# Patient Record
Sex: FEMALE | Race: WHITE | Employment: PART TIME | URBAN - METROPOLITAN AREA
[De-identification: names, ages, dates, MRNs, and addresses within clinical notes are randomized per-mention and may not be internally consistent; named-entity substitution may affect disease eponyms.]

---

## 2017-02-17 ENCOUNTER — OFFICE VISIT (OUTPATIENT)
Dept: INTERNAL MEDICINE CLINIC | Age: 52
End: 2017-02-17

## 2017-02-17 VITALS
WEIGHT: 157 LBS | DIASTOLIC BLOOD PRESSURE: 78 MMHG | SYSTOLIC BLOOD PRESSURE: 118 MMHG | HEIGHT: 67 IN | BODY MASS INDEX: 24.64 KG/M2 | HEART RATE: 68 BPM

## 2017-02-17 DIAGNOSIS — R29.2 HYPERREFLEXIA: Primary | ICD-10-CM

## 2017-02-17 DIAGNOSIS — G89.29 CHRONIC RIGHT-SIDED LOW BACK PAIN, WITH SCIATICA PRESENCE UNSPECIFIED: ICD-10-CM

## 2017-02-17 DIAGNOSIS — F10.10 ALCOHOL ABUSE: ICD-10-CM

## 2017-02-17 DIAGNOSIS — Z13.220 LIPID SCREENING: ICD-10-CM

## 2017-02-17 DIAGNOSIS — M54.5 CHRONIC RIGHT-SIDED LOW BACK PAIN, WITH SCIATICA PRESENCE UNSPECIFIED: ICD-10-CM

## 2017-02-17 DIAGNOSIS — Z11.59 NEED FOR HEPATITIS C SCREENING TEST: ICD-10-CM

## 2017-02-17 DIAGNOSIS — Z13.1 SCREENING FOR DIABETES MELLITUS: ICD-10-CM

## 2017-02-17 PROCEDURE — 99214 OFFICE O/P EST MOD 30 MIN: CPT | Performed by: INTERNAL MEDICINE

## 2017-02-17 RX ORDER — CETIRIZINE HYDROCHLORIDE 10 MG/1
10 TABLET ORAL DAILY
COMMUNITY

## 2017-02-18 ENCOUNTER — HOSPITAL ENCOUNTER (OUTPATIENT)
Dept: OTHER | Age: 52
Discharge: HOME | End: 2017-02-18
Attending: INTERNAL MEDICINE | Admitting: INTERNAL MEDICINE

## 2017-02-18 DIAGNOSIS — Z13.1 SCREENING FOR DIABETES MELLITUS: ICD-10-CM

## 2017-02-18 DIAGNOSIS — Z11.59 NEED FOR HEPATITIS C SCREENING TEST: ICD-10-CM

## 2017-02-18 DIAGNOSIS — Z13.220 LIPID SCREENING: ICD-10-CM

## 2017-02-18 DIAGNOSIS — R29.2 HYPERREFLEXIA: ICD-10-CM

## 2017-02-18 LAB
A/G RATIO: 1.3 (ref 1.1–2.2)
ALBUMIN SERPL-MCNC: 4.4 G/DL (ref 3.4–5)
ALP BLD-CCNC: 62 U/L (ref 40–129)
ALT SERPL-CCNC: 17 U/L (ref 10–40)
ANION GAP SERPL CALCULATED.3IONS-SCNC: 13 MMOL/L (ref 3–16)
AST SERPL-CCNC: 19 U/L (ref 15–37)
BILIRUB SERPL-MCNC: 0.7 MG/DL (ref 0–1)
BUN BLDV-MCNC: 12 MG/DL (ref 7–20)
CALCIUM SERPL-MCNC: 9.2 MG/DL (ref 8.3–10.6)
CHLORIDE BLD-SCNC: 101 MMOL/L (ref 99–110)
CHOLESTEROL, TOTAL: 206 MG/DL (ref 0–199)
CO2: 28 MMOL/L (ref 21–32)
CREAT SERPL-MCNC: <0.5 MG/DL (ref 0.6–1.1)
GFR AFRICAN AMERICAN: >60
GFR NON-AFRICAN AMERICAN: >60
GLOBULIN: 3.3 G/DL
GLUCOSE BLD-MCNC: 76 MG/DL (ref 70–99)
HCT VFR BLD CALC: 42.1 % (ref 36–48)
HDLC SERPL-MCNC: 86 MG/DL (ref 40–60)
HEMOGLOBIN: 14.1 G/DL (ref 12–16)
LDL CHOLESTEROL CALCULATED: 111 MG/DL
MCH RBC QN AUTO: 33.3 PG (ref 26–34)
MCHC RBC AUTO-ENTMCNC: 33.6 G/DL (ref 31–36)
MCV RBC AUTO: 99.2 FL (ref 80–100)
PDW BLD-RTO: 12.9 % (ref 12.4–15.4)
PLATELET # BLD: 249 K/UL (ref 135–450)
PMV BLD AUTO: 8.8 FL (ref 5–10.5)
POTASSIUM SERPL-SCNC: 4.7 MMOL/L (ref 3.5–5.1)
RBC # BLD: 4.25 M/UL (ref 4–5.2)
SODIUM BLD-SCNC: 142 MMOL/L (ref 136–145)
TOTAL PROTEIN: 7.7 G/DL (ref 6.4–8.2)
TRIGL SERPL-MCNC: 44 MG/DL (ref 0–150)
TSH REFLEX: 1.15 UIU/ML (ref 0.27–4.2)
VLDLC SERPL CALC-MCNC: 9 MG/DL
WBC # BLD: 4.7 K/UL (ref 4–11)

## 2017-02-19 LAB
FOLATE: >20 NG/ML (ref 4.78–24.2)
HEPATITIS C ANTIBODY INTERPRETATION: NORMAL
VITAMIN B-12: 587 PG/ML (ref 211–911)

## 2017-03-03 ENCOUNTER — OFFICE VISIT (OUTPATIENT)
Dept: INTERNAL MEDICINE CLINIC | Age: 52
End: 2017-03-03

## 2017-03-03 VITALS
BODY MASS INDEX: 24.33 KG/M2 | WEIGHT: 155 LBS | HEART RATE: 65 BPM | SYSTOLIC BLOOD PRESSURE: 124 MMHG | HEIGHT: 67 IN | DIASTOLIC BLOOD PRESSURE: 74 MMHG | OXYGEN SATURATION: 100 %

## 2017-03-03 DIAGNOSIS — G89.29 CHRONIC BILATERAL LOW BACK PAIN WITHOUT SCIATICA: ICD-10-CM

## 2017-03-03 DIAGNOSIS — M54.50 CHRONIC BILATERAL LOW BACK PAIN WITHOUT SCIATICA: ICD-10-CM

## 2017-03-03 DIAGNOSIS — F10.10 ALCOHOL ABUSE: ICD-10-CM

## 2017-03-03 DIAGNOSIS — R25.8 CLONUS: ICD-10-CM

## 2017-03-03 DIAGNOSIS — Z00.00 ANNUAL PHYSICAL EXAM: Primary | ICD-10-CM

## 2017-03-03 PROCEDURE — 90715 TDAP VACCINE 7 YRS/> IM: CPT | Performed by: INTERNAL MEDICINE

## 2017-03-03 PROCEDURE — 99396 PREV VISIT EST AGE 40-64: CPT | Performed by: INTERNAL MEDICINE

## 2017-03-03 PROCEDURE — 90471 IMMUNIZATION ADMIN: CPT | Performed by: INTERNAL MEDICINE

## 2017-03-03 RX ORDER — NALTREXONE HYDROCHLORIDE 50 MG/1
50 TABLET, FILM COATED ORAL DAILY
Qty: 30 TABLET | Refills: 1 | Status: SHIPPED | OUTPATIENT
Start: 2017-03-03 | End: 2018-02-07

## 2017-03-29 ENCOUNTER — TELEPHONE (OUTPATIENT)
Dept: INTERNAL MEDICINE CLINIC | Age: 52
End: 2017-03-29

## 2017-09-01 ENCOUNTER — OFFICE VISIT (OUTPATIENT)
Dept: INTERNAL MEDICINE CLINIC | Age: 52
End: 2017-09-01

## 2017-09-01 VITALS
DIASTOLIC BLOOD PRESSURE: 86 MMHG | HEART RATE: 82 BPM | OXYGEN SATURATION: 96 % | HEIGHT: 67 IN | BODY MASS INDEX: 24.17 KG/M2 | WEIGHT: 154 LBS | SYSTOLIC BLOOD PRESSURE: 133 MMHG

## 2017-09-01 DIAGNOSIS — M25.532 WRIST PAIN, ACUTE, LEFT: Primary | ICD-10-CM

## 2017-09-01 DIAGNOSIS — M25.561 ACUTE PAIN OF RIGHT KNEE: ICD-10-CM

## 2017-09-01 DIAGNOSIS — L98.9 SKIN LESIONS: ICD-10-CM

## 2017-09-01 PROCEDURE — 99214 OFFICE O/P EST MOD 30 MIN: CPT | Performed by: INTERNAL MEDICINE

## 2018-01-09 ENCOUNTER — OFFICE VISIT (OUTPATIENT)
Dept: DERMATOLOGY | Age: 53
End: 2018-01-09

## 2018-01-09 DIAGNOSIS — D22.9 MULTIPLE BENIGN NEVI: Primary | ICD-10-CM

## 2018-01-09 DIAGNOSIS — I87.8 VENOUS STASIS: ICD-10-CM

## 2018-01-09 DIAGNOSIS — L82.1 SEBORRHEIC KERATOSES: ICD-10-CM

## 2018-01-09 DIAGNOSIS — L98.8 FACIAL RHYTIDS: ICD-10-CM

## 2018-01-09 DIAGNOSIS — Z12.83 SCREENING EXAM FOR SKIN CANCER: ICD-10-CM

## 2018-01-09 PROCEDURE — 99243 OFF/OP CNSLTJ NEW/EST LOW 30: CPT | Performed by: DERMATOLOGY

## 2018-01-09 RX ORDER — M-VIT,TX,IRON,MINS/CALC/FOLIC 27MG-0.4MG
1 TABLET ORAL DAILY
COMMUNITY

## 2018-01-09 NOTE — PATIENT INSTRUCTIONS
Protecting Yourself From the Sun    · Apply broad spectrum water resistant sunscreen with an SPF of at least 30 to exposed areas of the skin. Dont forget the ears and lips! Remember to reapply sunscreen about every 2 hours and after swimming or sweating. · Wear sun protective clothing. Swim shirts (aka. rash guards) are a great idea and negates the need to reapply sunscreen in those areas. · Seek the shade whenever possible especially between the hours of 10 am and 4 pm when the suns rays are the strongest.     · Avoid tanning beds       Tretinoin / Differin (Adapalene) / Tazorac (Tazarotene)     Your physician has prescribed a topical medication that affects the growth and turnover of skin cells. We are providing you with the following information so that you understand how the medication should be used and potential side effects you may experience. [facility] Wash your face with mild soap and water, then allow to dry before applying the medication.  Apply a pea-sized amount to your entire face. Applying more than this may result in more irritation. Use the minimum amount possible to areas such as the corners of the mouth, the angles of the nose and the eyelids.  Common side effects include: burning/stinging, redness, dryness, peeling and itching. These side effects typically decrease with continued use of the medication.  You may use moisturizing creams or lotions to help reduce these side effects.  If you continue to experience these side effects, you may decrease your use of the medication to once every 2 or 3 days.  Your acne may appear worse during the first few weeks of using the medication.  It may take 6 weeks or more to notice improvement from the medication.  STOP using the medication if you become pregnant.

## 2018-01-09 NOTE — Clinical Note
Dear [facility],  I had the pleasure of seeing your patient, Fermin Gonzalez, in my office today. Thanks so much for involving me in her care. Please see my note and call me if you have any questions.   Hussein Herrera

## 2018-02-07 ENCOUNTER — OFFICE VISIT (OUTPATIENT)
Dept: INTERNAL MEDICINE CLINIC | Age: 53
End: 2018-02-07

## 2018-02-07 VITALS
BODY MASS INDEX: 24.8 KG/M2 | HEART RATE: 66 BPM | DIASTOLIC BLOOD PRESSURE: 76 MMHG | SYSTOLIC BLOOD PRESSURE: 124 MMHG | WEIGHT: 156 LBS

## 2018-02-07 DIAGNOSIS — G89.29 CHRONIC PAIN OF RIGHT KNEE: Primary | ICD-10-CM

## 2018-02-07 DIAGNOSIS — M25.561 CHRONIC PAIN OF RIGHT KNEE: Primary | ICD-10-CM

## 2018-02-07 PROCEDURE — G8484 FLU IMMUNIZE NO ADMIN: HCPCS | Performed by: INTERNAL MEDICINE

## 2018-02-07 PROCEDURE — 1036F TOBACCO NON-USER: CPT | Performed by: INTERNAL MEDICINE

## 2018-02-07 PROCEDURE — G8427 DOCREV CUR MEDS BY ELIG CLIN: HCPCS | Performed by: INTERNAL MEDICINE

## 2018-02-07 PROCEDURE — 3017F COLORECTAL CA SCREEN DOC REV: CPT | Performed by: INTERNAL MEDICINE

## 2018-02-07 PROCEDURE — G8420 CALC BMI NORM PARAMETERS: HCPCS | Performed by: INTERNAL MEDICINE

## 2018-02-07 PROCEDURE — 99213 OFFICE O/P EST LOW 20 MIN: CPT | Performed by: INTERNAL MEDICINE

## 2018-02-07 PROCEDURE — 3014F SCREEN MAMMO DOC REV: CPT | Performed by: INTERNAL MEDICINE

## 2018-04-18 ENCOUNTER — TELEPHONE (OUTPATIENT)
Dept: INTERNAL MEDICINE CLINIC | Age: 53
End: 2018-04-18

## 2018-04-23 PROBLEM — Z78.9 ALCOHOL CONSUMPTION OF MORE THAN FOUR DRINKS PER DAY: Status: ACTIVE | Noted: 2018-04-23

## 2018-04-24 ENCOUNTER — OFFICE VISIT (OUTPATIENT)
Dept: INTERNAL MEDICINE CLINIC | Age: 53
End: 2018-04-24

## 2018-04-24 VITALS
SYSTOLIC BLOOD PRESSURE: 134 MMHG | DIASTOLIC BLOOD PRESSURE: 74 MMHG | WEIGHT: 161 LBS | HEIGHT: 67 IN | HEART RATE: 76 BPM | BODY MASS INDEX: 25.27 KG/M2

## 2018-04-24 DIAGNOSIS — Z78.9 ALCOHOL CONSUMPTION OF MORE THAN FOUR DRINKS PER DAY: ICD-10-CM

## 2018-04-24 DIAGNOSIS — Z13.220 LIPID SCREENING: ICD-10-CM

## 2018-04-24 DIAGNOSIS — Z13.1 SCREENING FOR DIABETES MELLITUS: ICD-10-CM

## 2018-04-24 DIAGNOSIS — Z00.00 ANNUAL PHYSICAL EXAM: Primary | ICD-10-CM

## 2018-04-24 PROCEDURE — 99396 PREV VISIT EST AGE 40-64: CPT | Performed by: INTERNAL MEDICINE

## 2018-04-24 ASSESSMENT — PATIENT HEALTH QUESTIONNAIRE - PHQ9
SUM OF ALL RESPONSES TO PHQ9 QUESTIONS 1 & 2: 1
1. LITTLE INTEREST OR PLEASURE IN DOING THINGS: 0
2. FEELING DOWN, DEPRESSED OR HOPELESS: 1
SUM OF ALL RESPONSES TO PHQ QUESTIONS 1-9: 1

## 2018-08-14 ENCOUNTER — OFFICE VISIT (OUTPATIENT)
Dept: INTERNAL MEDICINE CLINIC | Age: 53
End: 2018-08-14

## 2018-08-14 VITALS
BODY MASS INDEX: 24.9 KG/M2 | DIASTOLIC BLOOD PRESSURE: 82 MMHG | HEART RATE: 76 BPM | OXYGEN SATURATION: 98 % | SYSTOLIC BLOOD PRESSURE: 134 MMHG | WEIGHT: 159 LBS

## 2018-08-14 DIAGNOSIS — M25.522 LEFT ELBOW PAIN: ICD-10-CM

## 2018-08-14 DIAGNOSIS — M25.562 ACUTE PAIN OF BOTH KNEES: Primary | ICD-10-CM

## 2018-08-14 DIAGNOSIS — M72.2 PLANTAR FASCIITIS OF RIGHT FOOT: ICD-10-CM

## 2018-08-14 DIAGNOSIS — M25.561 ACUTE PAIN OF BOTH KNEES: Primary | ICD-10-CM

## 2018-08-14 PROCEDURE — 3017F COLORECTAL CA SCREEN DOC REV: CPT | Performed by: INTERNAL MEDICINE

## 2018-08-14 PROCEDURE — 1036F TOBACCO NON-USER: CPT | Performed by: INTERNAL MEDICINE

## 2018-08-14 PROCEDURE — G8427 DOCREV CUR MEDS BY ELIG CLIN: HCPCS | Performed by: INTERNAL MEDICINE

## 2018-08-14 PROCEDURE — 99214 OFFICE O/P EST MOD 30 MIN: CPT | Performed by: INTERNAL MEDICINE

## 2018-08-14 PROCEDURE — G8420 CALC BMI NORM PARAMETERS: HCPCS | Performed by: INTERNAL MEDICINE

## 2018-08-14 NOTE — PROGRESS NOTES
tenderness found. Right foot: There is normal range of motion, no tenderness and no swelling. Skin: No pallor.

## 2019-04-09 ENCOUNTER — OFFICE VISIT (OUTPATIENT)
Dept: INTERNAL MEDICINE CLINIC | Age: 54
End: 2019-04-09
Payer: COMMERCIAL

## 2019-04-09 VITALS
BODY MASS INDEX: 24.75 KG/M2 | SYSTOLIC BLOOD PRESSURE: 120 MMHG | WEIGHT: 158 LBS | OXYGEN SATURATION: 98 % | HEART RATE: 62 BPM | DIASTOLIC BLOOD PRESSURE: 64 MMHG | TEMPERATURE: 97.9 F

## 2019-04-09 DIAGNOSIS — R14.0 ABDOMINAL DISTENTION: Primary | ICD-10-CM

## 2019-04-09 DIAGNOSIS — R10.814 LEFT LOWER QUADRANT ABDOMINAL TENDERNESS WITHOUT REBOUND TENDERNESS: ICD-10-CM

## 2019-04-09 DIAGNOSIS — Z83.49 FH: THYROID DISEASE: ICD-10-CM

## 2019-04-09 DIAGNOSIS — Z13.220 LIPID SCREENING: ICD-10-CM

## 2019-04-09 LAB
BILIRUBIN, POC: NORMAL
BLOOD URINE, POC: NORMAL
CLARITY, POC: CLEAR
COLOR, POC: YELLOW
CONTROL: NORMAL
GLUCOSE URINE, POC: NORMAL
KETONES, POC: 5
LEUKOCYTE EST, POC: NORMAL
NITRITE, POC: NORMAL
PH, POC: 7
PREGNANCY TEST URINE, POC: NEGATIVE
PROTEIN, POC: NORMAL
SPECIFIC GRAVITY, POC: 1
UROBILINOGEN, POC: 0.2

## 2019-04-09 PROCEDURE — 81002 URINALYSIS NONAUTO W/O SCOPE: CPT | Performed by: INTERNAL MEDICINE

## 2019-04-09 PROCEDURE — 81025 URINE PREGNANCY TEST: CPT | Performed by: INTERNAL MEDICINE

## 2019-04-09 PROCEDURE — 99214 OFFICE O/P EST MOD 30 MIN: CPT | Performed by: INTERNAL MEDICINE

## 2019-04-09 SDOH — HEALTH STABILITY: MENTAL HEALTH: HOW OFTEN DO YOU HAVE A DRINK CONTAINING ALCOHOL?: 4 OR MORE TIMES A WEEK

## 2019-04-09 SDOH — HEALTH STABILITY: MENTAL HEALTH: HOW MANY STANDARD DRINKS CONTAINING ALCOHOL DO YOU HAVE ON A TYPICAL DAY?: 3 OR 4

## 2019-04-09 ASSESSMENT — ENCOUNTER SYMPTOMS
COUGH: 0
SHORTNESS OF BREATH: 0

## 2019-04-09 NOTE — PROGRESS NOTES
[facility]  Internal Medicine Progress Note  Coleen Graham MD    DOS: 2019    Venessa Farris  :1965    ASSESSMENT/PLAN:   Abdominal distention  Left lower quadrant abdominal tenderness without rebound tenderness  Differential diagnosis includes but is not limited to diverticulitis, ovarian pathology such as cyst or mass. Check urine CBC and CMP today. Schedule abdominal pelvic CT. Patient instructed to contact office if unable to get scheduled for this week. Also ordered labs for her physical which is less than a month. Discussed medications with patient who voiced understanding of their use and indications. All questions answered. Future Appointments   Date Time Provider Matthieu Pickett   2019  9:00 AM Coleen Graham MD [facility]   2019 10:30 AM Laura Resendiz MD [facility]          Medical Assistant Triage:  Chief Complaint   Patient presents with   [facility]     swelling in abdomen, not painful but discomfort x 1 week. HPI:   This is a 52 y. o.female. She is here today for abdominal distention. .     Perimenopausal but only 2 periods in last week. 2 weeks ago had typcial symptoms of ovulation but also tender nipples. Has not had period since then, but since then still bloated in abdomen. Was constipated a few weeks ago but seems back to normal. Has small amount of blood with the constipation. Since then no melena or hematochezia. Normal caliber stool. Denies any abdominal pain. Decreased appetite. Feels full all of the time. No nausea or vomiting. Denies any dysuria urgency or frequency. No vaginal discharge at this time although had small amount 2 weeks ago when she had the left lower quadrant pain typical of her ovulation. No fevers chills or night sweats. Patient is particularly concerned about the abdominal distention because her brother is doing with end-stage pancreatic cancer and she has a sister who has had anal cancer.   Her last colonoscopy was in  was normal.  Next was due in . Review of Systems   Constitutional: Negative for unexpected weight change. Respiratory: Negative for cough and shortness of breath. Cardiovascular: Negative for chest pain and leg swelling. As per HPI. Patient Active Problem List   Diagnosis    Fatigue    Chronic depression    Lateral subluxation of patella    Knee pain    Patella, chondromalacia    Patella-femoral syndrome    Alcohol consumption of more than four drinks per day     Prior to Visit Medications    Medication Sig Taking? Authorizing Provider   Multiple Vitamins-Minerals (THERAPEUTIC MULTIVITAMIN-MINERALS) tablet Take 1 tablet by mouth daily Yes Historical Provider, MD   cetirizine (ZYRTEC) 10 MG tablet Take 10 mg by mouth daily Yes Historical Provider, MD   citalopram (CELEXA) 20 MG tablet Take 20 mg by mouth daily Yes Historical Provider, MD     No Known Allergies  Social History     Tobacco Use    Smoking status: Former Smoker     Packs/day: 0.50     Years: 4.00     Pack years: 2.00     Types: Cigarettes     Last attempt to quit: 1987     Years since quittin.6    Smokeless tobacco: Never Used   Substance Use Topics    Alcohol use: Yes     Alcohol/week: 12.6 oz     Types: 21 Glasses of wine per week     Frequency: 4 or more times a week     Drinks per session: 3 or 4     Comment:  4 drinks daily-beer,wine, gin    Drug use: No       BP Readings from Last 3 Encounters:   19 120/64   18 134/82   18 134/74     Wt Readings from Last 3 Encounters:   19 158 lb (71.7 kg)   18 159 lb (72.1 kg)   18 161 lb (73 kg)     OBJECTIVE:  Vitals:    19 1032   BP: 120/64   Pulse: 62   Temp: 97.9 °F (36.6 °C)   TempSrc: Oral   SpO2: 98%   Weight: 158 lb (71.7 kg)       Physical Exam   Constitutional: She appears well-nourished. No distress. Eyes: No scleral icterus. Cardiovascular: Normal rate and regular rhythm.    Pulmonary/Chest: Effort normal and breath sounds normal.   Abdominal: Soft. Normal appearance and bowel sounds are normal. There is no hepatosplenomegaly. There is tenderness in the left lower quadrant. There is no rigidity, no rebound and no guarding. Musculoskeletal: She exhibits no edema. Skin: No pallor.

## 2019-04-12 ENCOUNTER — HOSPITAL ENCOUNTER (OUTPATIENT)
Dept: CT IMAGING | Age: 54
Discharge: HOME | End: 2019-04-12
Payer: COMMERCIAL

## 2019-04-12 DIAGNOSIS — R10.814 LEFT LOWER QUADRANT ABDOMINAL TENDERNESS WITHOUT REBOUND TENDERNESS: ICD-10-CM

## 2019-04-12 PROCEDURE — 6360000004 HC RX CONTRAST MEDICATION: Performed by: INTERNAL MEDICINE

## 2019-04-12 PROCEDURE — 74177 CT ABD & PELVIS W/CONTRAST: CPT

## 2019-04-12 RX ADMIN — IOPAMIDOL 80 ML: 755 INJECTION, SOLUTION INTRAVENOUS at 10:27

## 2019-04-12 RX ADMIN — IOHEXOL 50 ML: 240 INJECTION, SOLUTION INTRATHECAL; INTRAVASCULAR; INTRAVENOUS; ORAL at 10:27

## 2019-04-15 DIAGNOSIS — N83.202 LEFT OVARIAN CYST: ICD-10-CM

## 2019-04-15 DIAGNOSIS — R14.0 ABDOMINAL BLOATING: Primary | ICD-10-CM

## 2019-04-17 ENCOUNTER — HOSPITAL ENCOUNTER (OUTPATIENT)
Dept: ULTRASOUND IMAGING | Age: 54
Discharge: HOME | End: 2019-04-17
Payer: COMMERCIAL

## 2019-04-17 DIAGNOSIS — N83.202 LEFT OVARIAN CYST: ICD-10-CM

## 2019-04-17 DIAGNOSIS — R14.0 ABDOMINAL BLOATING: ICD-10-CM

## 2019-04-17 PROCEDURE — 76830 TRANSVAGINAL US NON-OB: CPT

## 2019-04-17 PROCEDURE — 76856 US EXAM PELVIC COMPLETE: CPT

## 2019-05-08 ENCOUNTER — OFFICE VISIT (OUTPATIENT)
Dept: INTERNAL MEDICINE CLINIC | Age: 54
End: 2019-05-08
Payer: COMMERCIAL

## 2019-05-08 VITALS
HEART RATE: 76 BPM | BODY MASS INDEX: 24.8 KG/M2 | HEIGHT: 67 IN | OXYGEN SATURATION: 98 % | DIASTOLIC BLOOD PRESSURE: 78 MMHG | WEIGHT: 158 LBS | SYSTOLIC BLOOD PRESSURE: 124 MMHG

## 2019-05-08 DIAGNOSIS — Z00.00 ANNUAL PHYSICAL EXAM: Primary | ICD-10-CM

## 2019-05-08 DIAGNOSIS — Z12.11 COLON CANCER SCREENING: ICD-10-CM

## 2019-05-08 DIAGNOSIS — I87.2 STASIS DERMATITIS OF BOTH LEGS: ICD-10-CM

## 2019-05-08 PROCEDURE — 99396 PREV VISIT EST AGE 40-64: CPT | Performed by: INTERNAL MEDICINE

## 2019-05-08 NOTE — PATIENT INSTRUCTIONS
Recommendations for optimal health:  Be sure you are exercising at least 30 minutes  or 10,000 steps daily. Ideally you should try to get a mix of cardio and strength exercises. Work on [employer] For more detailed information, visit Nutrition Source web site- knowledge for healthy eating from [address] [place] [address]. [facility]      If your are using supplements, look for \"USP verified\" on the label. Helps to assure they are good quality. Vitamin D 800 units daily. Calcium intake - try for 800-1200 mg of calcium in combination of diet and supplements. You can read on this in much more detail on nutritionImpresstoe. org    8 Nutrition Tips for Eating Right:  1. Choose good carbs, not no carbs. Whole grains are your best bet. 2. Pay attention to the protein package. Fish, poultry, nuts, and beans are the best choices. 3. Choose foods with healthy fats, limit foods high in saturated fat, and avoid foods with trans fat. Plant oils, nuts, and fish are the healthiest sources. 4. Choose a fiber-filled diet, rich in whole grains, vegetables, and fruits. 5. Eat more vegetables and fruits. Go for color and variety--dark green, yellow, orange, and red. 6. Calcium is important. But milk isnt the only, or even best, source. 7. Water is best to quench your thirst. Skip the sugary drinks, and go easy on the milk and juice. 8. Eating less salt is good for everyones health. Choose more fresh foods and fewer processed foods. Aim for 2-3 cups of vegetables daily and 1 1/2-2 cups of fruits daily.
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
## 2019-06-24 ENCOUNTER — TELEPHONE (OUTPATIENT)
Dept: SURGERY | Age: 54
End: 2019-06-24

## 2019-07-15 ENCOUNTER — HOSPITAL ENCOUNTER (OUTPATIENT)
Dept: VASCULAR LAB | Age: 54
Discharge: HOME | End: 2019-07-15
Payer: COMMERCIAL

## 2019-07-15 PROCEDURE — 93970 EXTREMITY STUDY: CPT

## 2019-07-16 ENCOUNTER — TELEPHONE (OUTPATIENT)
Dept: INTERNAL MEDICINE CLINIC | Age: 54
End: 2019-07-16

## 2019-07-17 ENCOUNTER — TELEPHONE (OUTPATIENT)
Dept: INTERNAL MEDICINE CLINIC | Age: 54
End: 2019-07-17

## 2019-07-17 DIAGNOSIS — Z85.048 HISTORY OF RECTAL CANCER: Primary | ICD-10-CM

## 2019-07-17 RX ORDER — POLYETHYLENE GLYCOL 3350 17 G/17G
POWDER, FOR SOLUTION ORAL
Qty: 238 G | Refills: 0 | Status: ON HOLD | OUTPATIENT
Start: 2019-07-17 | End: 2019-08-28 | Stop reason: HOSPADM

## 2019-07-30 ENCOUNTER — OFFICE VISIT (OUTPATIENT)
Dept: INTERNAL MEDICINE CLINIC | Age: 54
End: 2019-07-30
Payer: COMMERCIAL

## 2019-07-30 VITALS
OXYGEN SATURATION: 98 % | WEIGHT: 161.2 LBS | HEART RATE: 74 BPM | BODY MASS INDEX: 25.3 KG/M2 | DIASTOLIC BLOOD PRESSURE: 80 MMHG | SYSTOLIC BLOOD PRESSURE: 102 MMHG | HEIGHT: 67 IN

## 2019-07-30 DIAGNOSIS — R59.9 REACTIVE LYMPHADENOPATHY: Primary | ICD-10-CM

## 2019-07-30 PROCEDURE — 99213 OFFICE O/P EST LOW 20 MIN: CPT | Performed by: INTERNAL MEDICINE

## 2019-07-30 NOTE — PROGRESS NOTES
[facility]  Internal Medicine Progress Note  Emy Tucekr MD    DOS: 2019    Erick Marcus  :1965    ASSESSMENT/PLAN:   Reactive lymphadenopathy  Various nicks and cuts on legs. Small inguinal nodes. Advised to also mention to gyn next week prior to exam but no further eval unless increase in size. Discussed medications with patient who voiced understanding of their use and indications. All questions answered. No follow-ups on file. This note was generated completely or in part utilizing Dragon dictation speech recognition software. Occasionally, words are mistranscribed and despite editing, the text may contain inaccuracies due to incorrect word recognition. If further clarification is needed please contact the office at [phone] Assistant Triage:  Chief Complaint   Patient presents with    Discuss Labs     ultrasound results         HPI:   This is a 62 y. o.female. She is here today for eval of inguinal lymph nodes. She had bilateral lower extremity ultrasound recently which was essentially unremarkable other than noting multiple inguinal nodes the largest of which was 1.3 cm by 0.5 cm. She states she is felt well. She does shave her legs and has various nicks etc. she also had an ingrown hair on the right inner thigh a week or 2 ago. She is not having any fevers chills itching or sweats. No vaginal discharge. Is scheduled to see her gynecologist next week for her annual.    Review of Systems As per HPI. Patient Active Problem List   Diagnosis    Fatigue    Chronic depression    Lateral subluxation of patella    Knee pain    Patella, chondromalacia    Patella-femoral syndrome    Alcohol consumption of more than four drinks per day     Prior to Visit Medications    Medication Sig Taking?  Authorizing Provider   polyethylene glycol (MIRALAX) powder Use as directed for colonoscopy prep Yes Marciano Kirby MD   bisacodyl (DULCOLAX) Left: Inguinal (Shotty) adenopathy present. No supraclavicular and no epitrochlear adenopathy present. Skin:   Several small mix from shaving on lower extremities.   No rash noted

## 2019-08-28 ENCOUNTER — HOSPITAL ENCOUNTER (OUTPATIENT)
Age: 54
Setting detail: OUTPATIENT SURGERY
Discharge: HOME | End: 2019-08-28
Attending: INTERNAL MEDICINE | Admitting: INTERNAL MEDICINE
Payer: COMMERCIAL

## 2019-08-28 ENCOUNTER — TELEPHONE (OUTPATIENT)
Dept: GASTROENTEROLOGY | Age: 54
End: 2019-08-28

## 2019-08-28 VITALS
HEART RATE: 78 BPM | DIASTOLIC BLOOD PRESSURE: 91 MMHG | WEIGHT: 158 LBS | BODY MASS INDEX: 24.8 KG/M2 | SYSTOLIC BLOOD PRESSURE: 132 MMHG | HEIGHT: 67 IN | TEMPERATURE: 98.3 F | RESPIRATION RATE: 15 BRPM | OXYGEN SATURATION: 96 %

## 2019-08-28 PROBLEM — Z83.71 FH: COLON POLYPS: Status: ACTIVE | Noted: 2019-08-28

## 2019-08-28 PROBLEM — Z85.048 HISTORY OF RECTAL CANCER: Status: ACTIVE | Noted: 2019-08-28

## 2019-08-28 PROBLEM — Z83.719 FH: COLON POLYPS: Status: ACTIVE | Noted: 2019-08-28

## 2019-08-28 LAB — PREGNANCY, URINE: NEGATIVE

## 2019-08-28 PROCEDURE — 6360000002 HC RX W HCPCS: Performed by: INTERNAL MEDICINE

## 2019-08-28 PROCEDURE — 2580000003 HC RX 258: Performed by: INTERNAL MEDICINE

## 2019-08-28 PROCEDURE — 2709999900 HC NON-CHARGEABLE SUPPLY: Performed by: INTERNAL MEDICINE

## 2019-08-28 PROCEDURE — 3609027000 HC COLONOSCOPY: Performed by: INTERNAL MEDICINE

## 2019-08-28 PROCEDURE — 45378 DIAGNOSTIC COLONOSCOPY: CPT | Performed by: INTERNAL MEDICINE

## 2019-08-28 PROCEDURE — 7100000011 HC PHASE II RECOVERY - ADDTL 15 MIN: Performed by: INTERNAL MEDICINE

## 2019-08-28 PROCEDURE — 99153 MOD SED SAME PHYS/QHP EA: CPT | Performed by: INTERNAL MEDICINE

## 2019-08-28 PROCEDURE — 99152 MOD SED SAME PHYS/QHP 5/>YRS: CPT | Performed by: INTERNAL MEDICINE

## 2019-08-28 PROCEDURE — 84703 CHORIONIC GONADOTROPIN ASSAY: CPT

## 2019-08-28 PROCEDURE — 7100000010 HC PHASE II RECOVERY - FIRST 15 MIN: Performed by: INTERNAL MEDICINE

## 2019-08-28 RX ORDER — MIDAZOLAM HYDROCHLORIDE 5 MG/ML
INJECTION INTRAMUSCULAR; INTRAVENOUS PRN
Status: DISCONTINUED | OUTPATIENT
Start: 2019-08-28 | End: 2019-08-28 | Stop reason: ALTCHOICE

## 2019-08-28 RX ORDER — SODIUM CHLORIDE 9 MG/ML
INJECTION, SOLUTION INTRAVENOUS CONTINUOUS
Status: DISCONTINUED | OUTPATIENT
Start: 2019-08-28 | End: 2019-08-28 | Stop reason: HOSPADM

## 2019-08-28 RX ORDER — FENTANYL CITRATE 50 UG/ML
INJECTION, SOLUTION INTRAMUSCULAR; INTRAVENOUS PRN
Status: DISCONTINUED | OUTPATIENT
Start: 2019-08-28 | End: 2019-08-28 | Stop reason: ALTCHOICE

## 2019-08-28 RX ADMIN — SODIUM CHLORIDE: 9 INJECTION, SOLUTION INTRAVENOUS at 09:44

## 2019-08-28 ASSESSMENT — PAIN SCALES - GENERAL
PAINLEVEL_OUTOF10: 0

## 2019-08-28 ASSESSMENT — PAIN - FUNCTIONAL ASSESSMENT: PAIN_FUNCTIONAL_ASSESSMENT: 0-10

## 2019-08-28 NOTE — TELEPHONE ENCOUNTER
----- Message from Rashard Guardado MD sent at 8/28/2019 10:27 AM EDT -----  -Repeat colonoscopy in 5 years due to family history of colorectal cancer in patients sister age 61.

## 2019-08-28 NOTE — OP NOTE
[address]  [address] [facility]  Phone: [phone]   CET:[phone]    Colonoscopy Procedure Note    Patient: Liam Carr  : 1965    Procedure: Colonoscopy with --screening    Date:  2019     Endoscopist:  Eder Zamorano MD    Referring Physician:  Stacy Urrutia MD    Preoperative Diagnosis:  FH: colon polyps [Z83.71]  History of rectal cancer [Z85.048]    Postoperative Diagnosis:  See impression    Anesthesia: Anesthesia: Moderate Sedation  Sedation: Versed 4 mg IV, fentanyl 100 mcg IV  Start Time: 10:05  Stop Time: 10:19  Withdrawal time: 7 minutes  ASA Class: 2  Mallampati: II (soft palate, uvula, fauces visible)    Indications: This is a 62y.o. year old female who presents today with screening for colon cancer for family history of rectal cancer in patients sister age 61.    pts mother also had multiple colon polyps removed. Procedure Details  Informed consent was obtained for the procedure, including sedation. Risks of perforation, hemorrhage, adverse drug reaction and aspiration were discussed. The patient was placed in the left lateral decubitus position. Based on the pre-procedure assessment, including review of the patient's medical history, medications, allergies, and review of systems, she had been deemed to be an appropriate candidate for conscious sedation; she was therefore sedated with the medications listed below. The patient was monitored continuously with ECG tracing, pulse oximetry, blood pressure monitoring, and direct observations. rectal examination was performed. The colonoscope was inserted into the rectum and advanced under direct vision to the cecum, which was identified by the ileocecal valve and appendiceal orifice. The quality of the colonic preparation was good.   A careful inspection was made as the colonoscope was withdrawn, including a retroflexed view of the rectum; findings and interventions

## 2020-02-06 NOTE — PATIENT INSTRUCTIONS
Protecting Yourself From the Sun    · Apply broad spectrum water resistant sunscreen with an SPF of at least 30 to exposed areas of the skin. Dont forget the ears and lips! Remember to reapply sunscreen about every 2 hours and after swimming or sweating. · Wear sun protective clothing. Swim shirts (aka. rash guards) are a great idea and negates the need to reapply sunscreen in those areas. · Seek the shade whenever possible especially between the hours of 10 am and 4 pm when the suns rays are the strongest.     · Avoid tanning beds     Cryosurgery (Freezing) Wound Care Instructions    AFTER THE PROCEDURE:    You will notice swelling and redness around the site. This is normal.    You may experience a sharp or sore feeling for the next several days. For this discomfort, you may take acetaminophen (Tylenol©).  A blister may develop at the treated area, sometimes as soon as by the end of the day. After several days, the blister will subside and a scab will form.  If the area is bumped or traumatized during the first few days following freezing, you may develop bleeding into the blister, forming a blood blister. This is nothing to be alarmed about.  If the blister is tense, uncomfortable, or much larger than the site that was frozen, you may pop the blister along its edge with a sterile needle (boiled, heated under a flame, or cleaned with alcohol) to allow the fluid to drain out. If the blister does not bother you, no treatment is needed.  Do NOT peel off the top of the blister roof. It will act as a dressing on top of your wound. WOUND CARE:    You may shower or bathe as usual, but avoid scrubbing the areas that have been frozen.  Cleanse the site twice a day with mild soapy water, and then apply a thin film of white petrolatum (Vaseline©).  You do not need to cover the area, but can if you prefer.     Do NOT allow the site to become dry or crusted, or attempt to dry it out with rubbing alcohol or hydrogen peroxide.  Continue this regimen until the area is pink and healed. Depending on the size and location of your cryosurgery site, healing may take 2 to 4 weeks.  The area may continue to be pink for several weeks, and over the next few months may become darker or lighter than the surrounding skin. This may be a permanent change.

## 2020-02-07 ENCOUNTER — OFFICE VISIT (OUTPATIENT)
Dept: DERMATOLOGY | Age: 55
End: 2020-02-07
Payer: COMMERCIAL

## 2020-02-07 PROCEDURE — 17110 DESTRUCTION B9 LES UP TO 14: CPT | Performed by: DERMATOLOGY

## 2020-02-07 PROCEDURE — 99213 OFFICE O/P EST LOW 20 MIN: CPT | Performed by: DERMATOLOGY

## 2020-02-07 RX ORDER — ACETAMINOPHEN 160 MG
TABLET,DISINTEGRATING ORAL
COMMUNITY

## 2020-04-14 ENCOUNTER — TELEMEDICINE (OUTPATIENT)
Dept: INTERNAL MEDICINE CLINIC | Age: 55
End: 2020-04-14
Payer: COMMERCIAL

## 2020-04-14 PROCEDURE — 99213 OFFICE O/P EST LOW 20 MIN: CPT | Performed by: INTERNAL MEDICINE

## 2020-04-14 ASSESSMENT — PATIENT HEALTH QUESTIONNAIRE - PHQ9
SUM OF ALL RESPONSES TO PHQ9 QUESTIONS 1 & 2: 0
SUM OF ALL RESPONSES TO PHQ QUESTIONS 1-9: 0
SUM OF ALL RESPONSES TO PHQ QUESTIONS 1-9: 0
1. LITTLE INTEREST OR PLEASURE IN DOING THINGS: 0
2. FEELING DOWN, DEPRESSED OR HOPELESS: 0

## 2020-06-03 ENCOUNTER — PATIENT MESSAGE (OUTPATIENT)
Dept: INTERNAL MEDICINE CLINIC | Age: 55
End: 2020-06-03

## 2020-06-09 ENCOUNTER — OFFICE VISIT (OUTPATIENT)
Dept: ORTHOPEDIC SURGERY | Age: 55
End: 2020-06-09
Payer: COMMERCIAL

## 2020-06-09 VITALS
DIASTOLIC BLOOD PRESSURE: 86 MMHG | SYSTOLIC BLOOD PRESSURE: 127 MMHG | BODY MASS INDEX: 24.8 KG/M2 | HEIGHT: 67 IN | WEIGHT: 158 LBS | TEMPERATURE: 98.2 F

## 2020-06-09 PROCEDURE — 99244 OFF/OP CNSLTJ NEW/EST MOD 40: CPT | Performed by: PHYSICIAN ASSISTANT

## 2020-06-09 NOTE — PROGRESS NOTES
New Patient: SPINE    Referring Provider:  Angi Cantrell MD    Chief Complaint   Patient presents with    Lower Back Pain     NP, LSP    Leg Pain     Bilateral leg pain, right greater than left       HISTORY OF PRESENT ILLNESS:      · The patient is being sent at the request of Angi Cantrell MD in consultation as a new spine patient for low back pain and bilateral leg pain. The patient is a 60 y.o. female whom reports symptoms for 3 months. Symptoms progressed over the last  3 months. Patient reports there was not a significant event to cause the symptoms. Today discomfort is report at 2 out of 10, describing it as sharp, dull, aching. Symptoms are aggravated by: sitting, bending, lying down, reaching, stretching. Patient has undergone recent treatment including, physical therapy and oral medications. Patient denies previous lumbar spine surgery. · The patient presents today as a new patient with lower back and bilateral leg pain right greater than left. The patient describes that she had a very busy January and February in regards to work where she was bent over working on spreadsheets and not sitting with correct posture. She then went ax throwing with her daughter and describes that the bending over and the action of throwing the ax increase her pain significantly in the lower back with radiation into the upper back and neck and occasionally down the arms. She describes that her right leg pain will radiate to the knee and she will have left leg pain radiating to the knee however the right leg is worse. She also describes that her pain is more significant in her hip joint itself. She also describes that she is having some headaches with the neck pain. The patient has been taking Aleve to help with her pain and she is also attended physical therapy over the last 4 weeks for a total of 7 visits for her lumbar spine. She has participated in physical therapy through [facility].   The patient has not had is possible that there are still dictated errors within this office note. If so, please bring any errors to my attention for an addendum. All efforts were made to ensure that this office note is accurate.

## 2020-06-12 ENCOUNTER — TELEPHONE (OUTPATIENT)
Dept: ORTHOPEDIC SURGERY | Age: 55
End: 2020-06-12

## 2020-06-12 NOTE — TELEPHONE ENCOUNTER
L/M FOR PATIENT regarding MRI LSP approval and authorization being valid until 12/7/2020. Patient was instructed to call [facility] to schedule MRI LSP, then contact our office for follow up appointment. MRI LSP results will not be given over the phone. Patient was also asked to allow a minimum 48 hours for follow up appointment from MRI scan in order for staff to obtain MRI report. Patient currently has a follow up appointment schedule for 6/17 @ [facility]. Advised to contact the office if needing to reschedule this to accommodate MRI scan.

## 2020-06-18 ENCOUNTER — OFFICE VISIT (OUTPATIENT)
Dept: ORTHOPEDIC SURGERY | Age: 55
End: 2020-06-18
Payer: COMMERCIAL

## 2020-06-18 VITALS
WEIGHT: 158.07 LBS | HEIGHT: 66 IN | SYSTOLIC BLOOD PRESSURE: 127 MMHG | BODY MASS INDEX: 25.4 KG/M2 | DIASTOLIC BLOOD PRESSURE: 86 MMHG

## 2020-06-18 PROCEDURE — 99214 OFFICE O/P EST MOD 30 MIN: CPT | Performed by: PHYSICIAN ASSISTANT

## 2020-06-18 NOTE — PROGRESS NOTES
redness  GI: Denies nausea, vomiting, diarrhea   : Denies bowel or bladder issues       PHYSICAL EXAM:    Vitals: Blood pressure 127/86, height 5' 6.5\" (1.689 m), weight 158 lb 1.1 oz (71.7 kg). GENERAL EXAM:  · General Apparence: Patient is adequately groomed with no evidence of malnutrition. · Psychiatric: Orientation: The patient is oriented to time, place and person. The patient's mood and affect are appropriate   · Vascular: Examination reveals no swelling and palpation reveals no tenderness in upper or lower extremities. Good capillary refill. · The lymphatic examination of the neck, axillae and groin reveals all areas to be without enlargement or induration  · Sensation is intact without deficit in the upper and lower extremities to light touch and pinprick  · Coordination of the upper and lower extremities are normal.  · RIGHT UPPER EXTREMITY:  Inspection/examination of the right upper extremity does not show any tenderness, deformity or injury. Range of motion is unremarkable and pain-free. There is no gross instability. There are no rashes, ulcerations or lesions. Strength and tone are normal. No atrophy or abnormal movements are noted. · LEFT UPPER EXTREMITY: Inspection/examination of the left upper extremity does not show any tenderness, deformity or injury. Range of motion is unremarkable and pain-free. There is no gross instability. There are no rashes, ulcerations or lesions. Strength and tone are normal. No atrophy or abnormal movements are noted. LUMBAR/SACRAL EXAMINATION:  · Inspection: Local inspection shows no step-off or bruising. Lumbar alignment is normal. No instability is noted. · Palpation:   No evidence of tenderness at the midline. Lumbar paraspinal tenderness: No tenderness to palpation of the lumbar paraspinals  Bursal tenderness No tenderness bilaterally  There is no paraspinal spasm.   · Range of Motion: limited by 25% in all planes due to pain  · Strength:   Strength testing is 5/5 in all muscle groups tested. · Special Tests:   Straight leg raise positive right and negative left and crossed SLR negative. Marbin's testing is negative bilaterally. FADIR's testing is negative bilaterally. Bowstring test negative. Slump test negative. · Skin: There are no rashes, ulcerations or lesions. · Reflexes: Reflexes are symmetrically 2+ at the patellar and ankle tendons. Clonus absent bilaterally at the feet. · Gait & station: normal, patient ambulates without assistance and no ataxia  · Additional Examinations:  · RIGHT LOWER EXTREMITY: Inspection/examination of the right lower extremity does not show any tenderness, deformity or injury. Range of motion is normal and pain-free. There is no gross instability. There are no rashes, ulcerations or lesions. Strength and tone are normal. No atrophy or abnormal movements are noted. · LEFT LOWER EXTREMITY:  Inspection/examination of the left lower extremity does not show any tenderness, deformity or injury. Range of motion is normal and pain-free. There is no gross instability. There are no rashes, ulcerations or lesions. Strength and tone are normal. No atrophy or abnormal movements are noted.       Diagnostic Testing:    MR Lumbar spine shows from 6/17/20:  FINDINGS:  Assume five lumbar vertebrae.  Lordosis is moderate-to-accentuated.  Conus    medullaris termination is normal.  Multilevel mild anterior mixed spondylotic disc    displacements.       Multifocal small incidental benign vertebral hemangiomas.       L1-2: Shallow central and right lateral disc protrusion gently deforms right ventral dural sac;    is near to right L2 nerve root.  Moderate facet hypertrophy, scant facet fluid.  Mild spinal    stenosis.       L2-3: Shallow central and right lateral disc protrusion with slight right inferior foraminal    extension gently encroaches upon ventral dural sac; contacts right L3 descending nerve root.     Mild right lateral recess narrowing.       L3-4: Disc is dehydrated; shallow disc bulge effaces ventral dural sac; is near to L4 nerve    roots.  Moderate facet hypertrophy.  Mild spinal stenosis.  Mild bilateral foraminal stenosis.       L4-5: 2mm anterolisthesis; shallow disc bulge straightens ventral dural sac; near to L5 nerve    roots.  Moderate facet hypertrophy.  Mild spinal stenosis.  Mild biforaminal stenosis.       L5-S1: 2mm retrolisthesis; listhesed disc contacts ventral dural sac and left greater than    right foraminal L5 nerve roots.  Mild left foraminal narrowing.  Mild facet arthropathy.       Slight hyperintense STIR signal at L4 bilateral pedicles and pars and L5 left pedicle and pars;    without fracture.                                           CONCLUSION:   1. L4 bilateral and L5 left pedicle/pars osseous stress; without fractures.  Interval change    from 03/17/2017 MRI. 2. L1-2 shallow central and right lateral protrusion gently deforming right ventral dural sac,    near to right L2 nerve root.  Mild spinal stenosis.  Unchanged from 03/17/2017 MRI. 3. L2-3 shallow central and right lateral protrusion with slight inferior foraminal extension;    contacts right L3 nerve root.  Unchanged from 03/17/2017 MRI. 4. L3-4 mild spinal stenosis.  Shallow disc bulge near to L4 nerve roots.  Unchanged from    03/17/2017 MRI. 5. L4-5 mild spinal stenosis.  Trace anterolisthesis; shallow disc bulge.  Unchanged from    03/17/2017 MRI. 6. L5-S1 trace retrolisthesis; listhesed disc contacting left greater than right foraminal L5    nerve roots.  Unchanged from 03/17/2017 MRI. Results for orders placed or performed during the hospital encounter of 08/28/19   POC Pregnancy Urine Qual   Result Value Ref Range    Pregnancy, Urine Negative Detects HCG level >20 MIU/mL     Impression:       1. Lumbar radiculopathy    2. HNP (herniated nucleus pulposus), lumbar    3.  DDD (degenerative disc disease), lumbar        Plan: Clinical Course: Above diagnoses are worsening    I discussed the diagnosis and the treatment options with Flavio Scott today. In Summary:  The various treatment options were outlined and discussed with Flavio Scott including:  Conservative care options: physical therapy, ice, medications, bracing, and activity modification. The indications for therapeutic injections. The indications for additional imaging/laboratory studies. The indications for (possible future) interventions. After considering the various options discussed, Flavio Scott elected to pursue a course of treatment that includes the followin. Medications:  No further recommendations for new medications. 2. PT:  Encouraged to continue with Home exercise program.    3. Further studies: No further studies. 4. Interventional:  We discussed pursuing a Right L2 and L3 TF epidural steroid injection to address the pain. Radiologic imaging and symptoms confirm the pain etiology. Risks, benefits and alternatives of interventional options were discussed. These include and are not limited to bleeding, infection, spinal headache, nerve injury and lack of pain relief. The patient verbalized understanding and would like to proceed. The patient will be scheduled accordingly. BOBBY #1.     5. Follow up:  4-6 weeks      Flavio Scott was instructed to call the office if her symptoms worsen or if new symptoms appear prior to the next scheduled visit. She is specifically instructed to contact the office between now & her scheduled appointment if she has concerns related to her condition or if she needs assistance in scheduling the above tests. She is welcome to call for an appointment sooner if she has any additional concerns or questions.            CYRIL Ochoa, PARobertC  Board Certified by the [employer] on Certification of Physician Assistants  [address] [facility]  Partner of [address] [facility]

## 2020-06-19 ENCOUNTER — OFFICE VISIT (OUTPATIENT)
Dept: PRIMARY CARE CLINIC | Age: 55
End: 2020-06-19
Payer: COMMERCIAL

## 2020-06-19 ENCOUNTER — TELEPHONE (OUTPATIENT)
Dept: ORTHOPEDIC SURGERY | Age: 55
End: 2020-06-19

## 2020-06-19 PROCEDURE — 99211 OFF/OP EST MAY X REQ PHY/QHP: CPT | Performed by: FAMILY MEDICINE

## 2020-06-19 NOTE — PROGRESS NOTES
Patient is having a/an SPINAL  with Dr. Aneudy Hutchison on 6/24/20 and was seen at clinic for pre op covid test. . Patient instructed to self quarantine until the procedure.

## 2020-06-20 LAB
SARS-COV-2: NOT DETECTED
SOURCE: NORMAL

## 2020-06-23 ENCOUNTER — TELEPHONE (OUTPATIENT)
Dept: ORTHOPEDIC SURGERY | Age: 55
End: 2020-06-23

## 2020-08-14 ENCOUNTER — NURSE TRIAGE (OUTPATIENT)
Dept: OTHER | Facility: CLINIC | Age: 55
End: 2020-08-14

## 2020-08-14 ENCOUNTER — OFFICE VISIT (OUTPATIENT)
Dept: INTERNAL MEDICINE CLINIC | Age: 55
End: 2020-08-14
Payer: COMMERCIAL

## 2020-08-14 VITALS
WEIGHT: 161 LBS | SYSTOLIC BLOOD PRESSURE: 120 MMHG | TEMPERATURE: 98.2 F | BODY MASS INDEX: 25.6 KG/M2 | DIASTOLIC BLOOD PRESSURE: 70 MMHG

## 2020-08-14 PROCEDURE — 99213 OFFICE O/P EST LOW 20 MIN: CPT | Performed by: INTERNAL MEDICINE

## 2020-08-14 NOTE — PATIENT INSTRUCTIONS
[facility]:  [facility]  [address] ([facility] [address])  [place]

## 2020-08-14 NOTE — TELEPHONE ENCOUNTER
Started out the 4th toe started out sore. It is normally deformed a bump on it. She is not sure if it is a but bite. She does not remember hurting it. It does not hurt to move it. There is a small little red area like a tip of a pin. When she presses on that area it hurts a little. Today she has some swelling about 1 inch of foot just under the 3rd, 4th, and 5th toe. Foot not red or hot. The swelling has now gone down some from this morning. Reason for Disposition   Patient wants to be seen    Answer Assessment - Initial Assessment Questions  1. ONSET: \"When did the pain start? \"       Started noticing the toe 1 week to 10 days ago. The swelling was just this morning. 2. LOCATION: \"Where is the pain located? \"       Started out the 4th toe started out sore. It is normally deformed a bump on it. She is not sure if it is a but bite. She does not remember hurting it. It does not hurt to move it. There is a small little red area like a tip of a pin. When she presses on that area it hurts a little. Today she has some swelling about 1 inch of foot just under the 3rd, 4th, and 5th toe. Foot not red or hot. The swelling has now gone down some from this morning. 3. PAIN: \"How bad is the pain? \"    (Scale 1-10; or mild, moderate, severe)    -  MILD (1-3): doesn't interfere with normal activities     -  MODERATE (4-7): interferes with normal activities (e.g., work or school) or awakens from sleep, limping     -  SEVERE (8-10): excruciating pain, unable to do any normal activities, unable to walk      1-2/10, it is more annoying that pain. 4. WORK OR EXERCISE: \"Has there been any recent work or exercise that involved this part of the body? \"       can not think of anything. 5. CAUSE: \"What do you think is causing the foot pain?\"        6. OTHER SYMPTOMS: \"Do you have any other symptoms? \" (e.g., leg pain, rash, fever, numbness)      Deneie, also foot is not red or hot. 7.  PREGNANCY: \"Is there

## 2020-08-14 NOTE — PROGRESS NOTES
[facility]  Internal Medicine Progress Note  Madhuri Zamora MD    DOS: 2020    Karon Marcus  :1965    ASSESSMENT/PLAN:   Pain of toe of left foot  Hammertoe with abrasion/rubbing. No sign infection. -     XR FOOT LEFT (MIN 3 VIEWS); Future    Pain in metatarsus of left foot  Possible stress fracture. Seems unusual that it is base of same digit noted above. Xray foot and refer to podiatry. Ok to continue with ibuprofen that she is taking for back   -     XR FOOT LEFT (MIN 3 VIEWS); Future    Discussed medications with patient who voiced understanding of their use and indications. All questions answered. This note was generated completely or in part utilizing Dragon dictation speech recognition software. Occasionally, words are mistranscribed and despite editing, the text may contain inaccuracies due to incorrect word recognition. If further clarification is needed please contact the office at [phone] Assistant Triage:  Chief Complaint   Patient presents with    Foot Problem         HPI:   This is a 60 y. o.female. She is here today for left toe/ foot problems. Toes rub due to some deformities. Left fourth toe has been bothering him more lately. She noticed a tiny red spot on the toe today. Also having more pain in the forefoot and noted mild swelling today. No fever or chills. No trauma to the foot. No history of gout    Review of Systems As per HPI. Patient Active Problem List   Diagnosis    Fatigue    Chronic depression    Lateral subluxation of patella    Knee pain    Patella, chondromalacia    Patella-femoral syndrome    Alcohol consumption of more than four drinks per day    History of rectal cancer    FH: colon polyps     Prior to Visit Medications    Medication Sig Taking?  Authorizing Provider   Cholecalciferol (VITAMIN D3) 50 MCG (2000) CAPS Take by mouth Yes Historical Provider, MD   Multiple Vitamins-Minerals (THERAPEUTIC

## 2020-08-15 ENCOUNTER — HOSPITAL ENCOUNTER (OUTPATIENT)
Age: 55
Discharge: HOME | End: 2020-08-15
Payer: COMMERCIAL

## 2020-08-15 ENCOUNTER — HOSPITAL ENCOUNTER (OUTPATIENT)
Dept: GENERAL RADIOLOGY | Age: 55
Discharge: HOME | End: 2020-08-15
Payer: COMMERCIAL

## 2020-08-15 PROCEDURE — 73630 X-RAY EXAM OF FOOT: CPT

## 2020-08-17 ENCOUNTER — TELEPHONE (OUTPATIENT)
Dept: INTERNAL MEDICINE CLINIC | Age: 55
End: 2020-08-17

## 2020-08-17 NOTE — TELEPHONE ENCOUNTER
Patient has an Xray of her foot done on Saturday. She is seeing the Podiatrist tomorrow and would like Dr. Nelda Astudillo to forward result to Dr. Lilian Hahn.   Fax: [phone]

## 2020-08-18 NOTE — TELEPHONE ENCOUNTER
Geovanna with Dr. Estephania Renae office is calling to say she has not received this fax. Can you please refax? Patient is in their office for appointment now.

## 2020-11-16 ENCOUNTER — TELEPHONE (OUTPATIENT)
Dept: INTERNAL MEDICINE CLINIC | Age: 55
End: 2020-11-16

## 2020-11-16 NOTE — TELEPHONE ENCOUNTER
Patient is calling to report that she has been told by her OB/GYN and a vaccine clinic that she has HBP. Patient bought a BP machine a couple of days. Her top number has ranged 147-163 and the bottom number range is . Patient is aware that Dr. Devika Bone is not working today.

## 2021-02-08 ENCOUNTER — OFFICE VISIT (OUTPATIENT)
Dept: DERMATOLOGY | Age: 56
End: 2021-02-08
Payer: COMMERCIAL

## 2021-02-08 VITALS — TEMPERATURE: 98.5 F

## 2021-02-08 DIAGNOSIS — Z12.83 SCREENING EXAM FOR SKIN CANCER: ICD-10-CM

## 2021-02-08 DIAGNOSIS — L82.1 SEBORRHEIC KERATOSES: ICD-10-CM

## 2021-02-08 DIAGNOSIS — D22.9 MULTIPLE BENIGN NEVI: Primary | ICD-10-CM

## 2021-02-08 DIAGNOSIS — L82.0 INFLAMED SEBORRHEIC KERATOSIS: ICD-10-CM

## 2021-02-08 PROCEDURE — 17110 DESTRUCTION B9 LES UP TO 14: CPT | Performed by: DERMATOLOGY

## 2021-02-08 PROCEDURE — 99213 OFFICE O/P EST LOW 20 MIN: CPT | Performed by: DERMATOLOGY

## 2021-11-03 ENCOUNTER — OFFICE VISIT (OUTPATIENT)
Dept: INTERNAL MEDICINE CLINIC | Age: 56
End: 2021-11-03
Payer: COMMERCIAL

## 2021-11-03 VITALS
BODY MASS INDEX: 25.27 KG/M2 | HEART RATE: 85 BPM | WEIGHT: 161 LBS | DIASTOLIC BLOOD PRESSURE: 62 MMHG | HEIGHT: 67 IN | SYSTOLIC BLOOD PRESSURE: 150 MMHG

## 2021-11-03 DIAGNOSIS — F10.20 ALCOHOL USE DISORDER, MODERATE, DEPENDENCE (HCC): ICD-10-CM

## 2021-11-03 DIAGNOSIS — Z78.9 ALCOHOL CONSUMPTION OF MORE THAN FOUR DRINKS PER DAY: ICD-10-CM

## 2021-11-03 DIAGNOSIS — Z13.1 SCREENING FOR DIABETES MELLITUS: ICD-10-CM

## 2021-11-03 DIAGNOSIS — R03.0 ELEVATED BLOOD PRESSURE READING IN OFFICE WITHOUT DIAGNOSIS OF HYPERTENSION: ICD-10-CM

## 2021-11-03 DIAGNOSIS — F32.A CHRONIC DEPRESSION: ICD-10-CM

## 2021-11-03 DIAGNOSIS — Z00.00 ANNUAL PHYSICAL EXAM: Primary | ICD-10-CM

## 2021-11-03 DIAGNOSIS — Z13.220 LIPID SCREENING: ICD-10-CM

## 2021-11-03 PROCEDURE — 99396 PREV VISIT EST AGE 40-64: CPT | Performed by: INTERNAL MEDICINE

## 2021-11-03 PROCEDURE — 90472 IMMUNIZATION ADMIN EACH ADD: CPT | Performed by: INTERNAL MEDICINE

## 2021-11-03 PROCEDURE — 90750 HZV VACC RECOMBINANT IM: CPT | Performed by: INTERNAL MEDICINE

## 2021-11-03 PROCEDURE — 99213 OFFICE O/P EST LOW 20 MIN: CPT | Performed by: INTERNAL MEDICINE

## 2021-11-03 PROCEDURE — 90674 CCIIV4 VAC NO PRSV 0.5 ML IM: CPT | Performed by: INTERNAL MEDICINE

## 2021-11-03 PROCEDURE — 90471 IMMUNIZATION ADMIN: CPT | Performed by: INTERNAL MEDICINE

## 2021-11-03 RX ORDER — CITALOPRAM 20 MG/1
20 TABLET ORAL DAILY
Qty: 90 TABLET | Refills: 1 | Status: SHIPPED | OUTPATIENT
Start: 2021-11-03 | End: 2022-02-02

## 2021-11-03 ASSESSMENT — ENCOUNTER SYMPTOMS
RESPIRATORY NEGATIVE: 1
EYES NEGATIVE: 1
GASTROINTESTINAL NEGATIVE: 1

## 2021-11-03 NOTE — PROGRESS NOTES
ASSESSMENT/PLAN:   Annual physical/preventive evaluation  Discussed age appropriate preventive care including healthy diet, daily exercise, immunizations and age & gender guided screening tests. Flu shot today. Shingrix today. Screening labs. Up to date on covid vaccine (did study with [employer])  Screening for diabetes mellitus  -     Hemoglobin A1C; Future  -     Basic Metabolic Panel; Future  Lipid screening  -     Lipid Panel; Future  Alcohol consumption of more than four drinks per day  -     Protime-INR; Future  -     CBC; Future  -     Hepatic Function Panel; Future  Alcohol use disorder, moderate, dependence (HCC)  Assessment & Plan:  Drinking at least 1 bottle wine nightly. Motivated to quit. Recently 1 week alcohol free with no withdrawal.     Didn't tolerate Naltrexone due to persistent nausea. Reviewedtreatment options. Will start in psychotherapy with addiction specialist.  Wants to try disulfuram.   Labs today and if everything essentially normal can start on disulfuram..     Chronic depression  Assessment & Plan:   Stable,comtinue lexapro. I will take over prescribing of her escitalopram.   Elevated blood pressure reading in office without diagnosis of hypertension  Assessment & Plan:   Asymptomatic, lifestyle modifications recommended, initially will focus on cutting out or back on alcohol intake. Also reviewed role of diet and exercise. Follow-up TBD        Kannan Keane (:  1965) is a 60 y.o. female, here for annual preventive visit. Had astra zen[employer] x 2 doses in a study. Found out when unblinded. Also mild covid in September. Back issues last year from axe throwing and then foot issue requiring PRP. Now able to get back to it. Exercise: just starting back to walking and strength traiing    No alcohol for 8 days when was sick with COVID. No withdrawal symptoms. Nausea when tried the depade. Tried for a month. Never went away. Drinks between 5-8 mainly. Feels it's primarily a habit because bored, however when decides will drink less, then often ends up going out and getting another bottle. Patient Care Team:  Joanna Gramajo MD as PCP - General  Joanna Gramajo MD as PCP - [facility] Empaneled Provider  Kelvin Wolff MD  [address] (Gynecology)  Kathy Ware MD (Dermatology)  Melissa Ellis DPM as Consulting Physician (Podiatry)    Health Maintenance   Topic Date Due    COVID-19 Vaccine (1) Never done    Shingles Vaccine (2 of 2) 12/29/2021    Diabetes screen  04/09/2022    Breast cancer screen  08/28/2022    Cervical cancer screen  06/25/2023    Lipid screen  04/09/2024    Colon cancer screen colonoscopy  08/28/2024    DTaP/Tdap/Td vaccine (2 - Td or Tdap) 03/03/2027    Flu vaccine  Completed    Hepatitis C screen  Completed    Hepatitis A vaccine  Aged Out    Hepatitis B vaccine  Aged Out    Hib vaccine  Aged Out    Meningococcal (ACWY) vaccine  Aged Out    Pneumococcal 0-64 years Vaccine  Aged Out    HIV screen  Discontinued     Immunization History   Administered Date(s) Administered    Influenza 11/07/2012, 11/08/2013    Influenza, MDCK Quadv, IM, PF (Flucelvax 2 yrs and older) 11/03/2021    Tdap (Boostrix, Adacel) 03/03/2017    Tetanus 01/01/2010    Zoster Recombinant (Shingrix) 11/03/2021       Past Medical History:   Diagnosis Date    Arthritis     rt big toe     Depression     chronic    Environmental allergies     FH: celiac disease     negative antibody testing 1/2015.     History of chickenpox     History of mumps as a child     Personal history of COVID-19 09/2021       Outpatient Medications Marked as Taking for the 11/3/21 encounter (Office Visit) with Joanna Gramajo MD   Medication Sig Dispense Refill    Homeopathic Products (ARTHRITIS RELIEF PO) Take by mouth daily      citalopram (CELEXA) 20 MG tablet Take 1 tablet by mouth daily 90 tablet 1    Cholecalciferol (VITAMIN D3) 50 MCG (2000 UT) CAPS Take by mouth  Multiple Vitamins-Minerals (THERAPEUTIC MULTIVITAMIN-MINERALS) tablet Take 1 tablet by mouth daily      cetirizine (ZYRTEC) 10 MG tablet Take 10 mg by mouth daily          No Known Allergies    Past Surgical History:   Procedure Laterality Date    COLONOSCOPY N/A 2019    COLONOSCOPY performed by Mary Cali MD at [address] [place] [address]       Social History     Tobacco Use    Smoking status: Former Smoker     Packs/day: 0.50     Years: 4.00     Pack years: 2.00     Types: Cigarettes     Quit date: 1987     Years since quittin.1    Smokeless tobacco: Never Used   Substance Use Topics    Alcohol use: Yes     Alcohol/week: 21.0 standard drinks     Types: 21 Glasses of wine per week     Comment:  4 drinks daily-beer,wine, gin    Drug use: No        Family History   Problem Relation Age of Onset    Dementia Mother     Diabetes Mother     Colon Polyps Mother     Other Father          of pneumponia /sepsis at age 80    Stroke Father     Cancer Father         skin? type    Cancer Sister         anal,    Thyroid Disease Sister         thyroidectomy- not sure why    Celiac Disease Brother     Parkinsonism Brother     Clotting Disorder Brother         complications of blood -HIV    Pancreatic Cancer Brother 79     Review of Systems   Constitutional: Negative. HENT: Negative. Eyes: Negative. Respiratory: Negative. Cardiovascular: Negative. Gastrointestinal: Negative. Genitourinary: Negative. Skin: Negative. Neurological: Negative. Psychiatric/Behavioral: Negative.         Wt Readings from Last 5 Encounters:   21 161 lb (73 kg)   20 161 lb (73 kg)   20 158 lb 1.1 oz (71.7 kg)   20 158 lb (71.7 kg)   19 158 lb (71.7 kg)     Vitals:    21 1339 21 1359   BP: (!) 144/68 (!) 150/62   Site: Right Upper Arm    Position: Sitting    Cuff Size: Medium Adult    Pulse: 85    Weight: 161 lb (73 kg)    Height: 5' 7\" (1.702 m)      Estimated body mass index is 25.22 kg/m² as calculated from the following:    Height as of this encounter: 5' 7\" (1.702 m). Weight as of this encounter: 161 lb (73 kg). Physical Exam  Constitutional:       Appearance: She is well-developed. HENT:      Head: Normocephalic and atraumatic. Right Ear: Tympanic membrane and ear canal normal.      Left Ear: Tympanic membrane and ear canal normal.      Mouth/Throat:      Pharynx: Uvula midline. Eyes:      Conjunctiva/sclera: Conjunctivae normal.      Pupils: Pupils are equal, round, and reactive to light. Neck:      Vascular: No carotid bruit. Cardiovascular:      Rate and Rhythm: Normal rate and regular rhythm. Heart sounds: Normal heart sounds. Pulmonary:      Effort: Pulmonary effort is normal.      Breath sounds: Normal breath sounds. Abdominal:      General: Bowel sounds are normal.      Palpations: Abdomen is soft. There is no hepatomegaly or splenomegaly. Musculoskeletal:      Cervical back: Normal range of motion and neck supple. Right lower leg: No edema. Left lower leg: No edema. Skin:     General: Skin is warm and dry. Findings: No rash. Comments: No spider angiomata   Neurological:      General: No focal deficit present. Mental Status: She is alert. Psychiatric:         Mood and Affect: Mood normal.         No flowsheet data found.     Lab Results   Component Value Date    CHOL 206 02/18/2017    CHOLFAST 205 04/09/2019    TRIG 44 02/18/2017    TRIGLYCFAST 58 04/09/2019    HDL 81 04/09/2019    LDLCALC 112 04/09/2019    GLUF 96 04/09/2019    GLUCOSE 76 02/18/2017       The 10-year ASCVD risk score (Stephen Arteaga et al., 2013) is: 2.8%    Values used to calculate the score:      Age: 60 years      Sex: Female      Is Non- : No      Diabetic: No      Tobacco smoker: No      Systolic Blood Pressure: 719 mmHg      Is BP treated: No      HDL Cholesterol: 81 mg/dL      Total Cholesterol: 205 mg/dL      An electronic signature was used to authenticate this note.     --Franklyn Higgins MD

## 2021-11-03 NOTE — PATIENT INSTRUCTIONS
Gynecologists  Allana Martinet, MD Geoffrey Holter, MD [place], [place]   [phone]     [address], [address] ([address])    [facility]  [phone]      Psychologists/Therapists:  [address] [place] [address]. therapist- specializes in addiction. Samantha Lozada [phone]  Ramón Mckeon [phone]                Patient Education        disulfiram  Pronunciation:  dye SUL fi nathaly  Brand:  Antabuse  What is the most important information I should know about disulfiram?  You should not use disulfiram if you have recently taken metronidazole or paraldehyde, or if you have consumed any foods or products that contain alcohol (mouthwash, cough medicine, cooking wine or vinegar, certain desserts, and others). Do not take disulfiram if you have consumed alcohol within the past 12 hours. Do not drink alcohol while taking disulfiram, and for up to 14 days after you stop taking this medicine. Disulfiram should never be given to a person without his or her knowledge of taking the medicine. What is disulfiram?  Disulfiram blocks an enzyme that is involved in metabolizing alcohol intake. Disulfiram produces very unpleasant side effects when combined with alcohol in the body. Disulfiram is used in certain people with chronic alcoholism. This medicine can help keep you from drinking because of the unpleasant side effects that will occur if you consume alcohol while taking disulfiram.  Disulfiram is used together with behavior modification, psychotherapy, and counseling support to help you stop drinking. This medicine is not a cure  for alcoholism. Disulfiram may also be used for purposes not listed in this medication guide. What should I discuss with my healthcare provider before taking disulfiram?  Do not take disulfiram if you have consumed alcohol within the past 12 hours. Do not drink alcohol while taking disulfiram and for up to 14 days after you stop taking this medicine.   You should not use disulfiram if you are allergic to it, or if:  · you have recently taken metronidazole (Flagyl) or paraldehyde; or  · you have consumed any foods or products that contain alcohol (mouthwash, cough medicine, cooking wine or vinegar, certain desserts, and others). To make sure disulfiram is safe for you, tell your doctor if you have:  · liver or kidney disease;  · heart disease, high blood pressure, history of heart attack or stroke;  · underactive thyroid;  · diabetes;  · seizures or epilepsy;  · head injury or brain damage;  · a history of mental illness or psychosis;  · an allergy to rubber; or  · if you take phenytoin (Dilantin), tuberculosis medicine, or a blood thinner (warfarin, Coumadin, [place]). FDA pregnancy category C. It is not known whether disulfiram will harm an unborn baby. Tell your doctor if you are pregnant or plan to become pregnant while using this medicine. It is not known whether disulfiram passes into breast milk or if it could harm a nursing baby. You should not breast-feed while using this medicine. Do not give this medicine to anyone under 25years old without medical advice. How should I take disulfiram?  Follow all directions on your prescription label. Your doctor may occasionally change your dose to make sure you get the best results. Do not take this medicine in larger or smaller amounts or for longer than recommended. You will need frequent blood tests to check your liver function. Wear a medical alert tag or carry an ID card stating that you take disulfiram. Any medical care provider who treats you should know that you are using disulfiram.  When disulfiram is used as part of a treatment program for alcohol addiction or detoxification, your doctor may recommend that this medicine be given to you by a family member or other caregiver. This is to make sure you are using the medicine as it was prescribed as part of your treatment.   Additional forms of counseling and/or monitoring may be recommended during treatment with disulfiram.  For best results, keep using this medicine as directed. Disulfiram is sometimes given for up to several months or years. Store at room temperature away from moisture, heat, and light. What happens if I miss a dose? Take the missed dose as soon as you remember. Take the rest of the day's doses at evenly spaced intervals unless otherwise directed by your doctor. What happens if I overdose? Seek emergency medical attention or call the Poison Help line at 1-327.679.6256. What should I avoid while taking disulfiram?  Do not drink alcohol while taking disulfiram.  Avoid situations that might tempt you to drink. Be aware that many common products contain small amounts of alcohol, enough to cause a disulfiram reaction. Such products include aftershave, cologne, perfume, antiperspirant, mouthwash, antiseptic astringent skin products, hair dyes, and others. Check the label to see if any food or medicine product contains alcohol. Ask your pharmacist if you have questions. Avoid coming into contact with non-consumable products that may contain alcohol: paint thinners, solvents, stains, lacquers and others. Avoid coming into contact with or breathing the fumes of pesticides or chemicals used in manufacturing or certain other industries (waxes, dyes, resins, and gums). What are the possible side effects of disulfiram?  Get emergency medical help if you have any of these signs of an allergic reaction: hives; difficult breathing; swelling of your face, lips, tongue, or throat. Even small amounts of alcohol can produce unpleasant symptoms while disulfiram is in your body.  These symptoms include:  · flushing (warmth, redness, or tingly feeling);  · sweating, increased thirst, swelling, rapid weight gain;  · nausea, severe vomiting;  · neck pain, throbbing headache, blurred vision;  · chest pain, shortness of breath (even with mild exertion);  · fast or pounding heartbeats or fluttering in your chest;  · confusion, weakness, spinning sensation, feeling unsteady; or  · a light-headed feeling, like you might pass out. More severe symptoms may occur when disulfiram and large amounts of alcohol are used together, such as severe chest pain spreading to your jaw or shoulder, slow heart rate, weak pulse, seizure, fainting, weak or shallow breathing, or slow breathing (breathing may stop). A disulfiram-alcohol reaction can be fatal.  Call your doctor at once if you have:  · eye pain or sudden vision loss;  · confusion, unusual thoughts or behavior; or  · liver problems --nausea, upper stomach pain, itching, tired feeling, loss of appetite, dark urine, aranza-colored stools, jaundice (yellowing of the skin or eyes). Common side effects may include:  · skin rash, acne;  · mild headache, tired feeling;  · impotence, loss of interest in sex; or  · metallic or garlic-like taste in the mouth. This is not a complete list of side effects and others may occur. Call your doctor for medical advice about side effects. You may report side effects to FDA at 5-167-FDA-7590. What other drugs will affect disulfiram?  Other drugs may interact with disulfiram, including prescription and over-the-counter medicines, vitamins, and herbal products. Tell each of your health care providers about all medicines you use now and any medicine you start or stop using. Where can I get more information? Your pharmacist can provide more information about disulfiram.  Remember, keep this and all other medicines out of the reach of children, never share your medicines with others, and use this medication only for the indication prescribed. Every effort has been made to ensure that the information provided by [address] is accurate, up-to-date, and complete, but no guarantee is made to that effect. Drug information contained herein may be time sensitive.  [employer] information has been compiled for use by healthcare practitioners and consumers in the [employer] and therefore [facility] does not warrant that uses outside of the [employer] are appropriate, unless specifically indicated otherwise. [facility]'s drug information does not endorse drugs, diagnose patients or recommend therapy. [facility]'s drug information is an informational resource designed to assist licensed healthcare practitioners in caring for their patients and/or to serve consumers viewing this service as a supplement to, and not a substitute for, the expertise, skill, knowledge and judgment of healthcare practitioners. The absence of a warning for a given drug or drug combination in no way should be construed to indicate that the drug or drug combination is safe, effective or appropriate for any given patient. [facility] does not assume any responsibility for any aspect of healthcare administered with the aid of information [facility] provides. The information contained herein is not intended to cover all possible uses, directions, precautions, warnings, drug interactions, allergic reactions, or adverse effects. If you have questions about the drugs you are taking, check with your doctor, nurse or pharmacist.  Copyright 3707-7215 [address]: 2.01. Revision date: 3/13/2014. Care instructions adapted under license by [facility] ([facility]). If you have questions about a medical condition or this instruction, always ask your healthcare professional. [place] [address] any warranty or liability for your use of this information.

## 2021-11-04 NOTE — ASSESSMENT & PLAN NOTE
Asymptomatic, lifestyle modifications recommended, initially will focus on cutting out or back on alcohol intake. Also reviewed role of diet and exercise.
Drinking at least 1 bottle wine nightly. Motivated to quit. Recently 1 week alcohol free with no withdrawal.     Didn't tolerate Naltrexone due to persistent nausea. Reviewedtreatment options. Will start in psychotherapy with addiction specialist.  Wants to try disulfuram.   Labs today and if everything essentially normal can start on disulfuram..
Mesha,comtinue lexapro.   I will take over prescribing of her escitalopram.
Patent

## 2022-02-08 LAB — MAMMOGRAPHY, EXTERNAL: NORMAL

## 2022-03-02 ENCOUNTER — OFFICE VISIT (OUTPATIENT)
Dept: INTERNAL MEDICINE CLINIC | Age: 57
End: 2022-03-02
Payer: COMMERCIAL

## 2022-03-02 VITALS
OXYGEN SATURATION: 98 % | WEIGHT: 162 LBS | SYSTOLIC BLOOD PRESSURE: 130 MMHG | DIASTOLIC BLOOD PRESSURE: 74 MMHG | HEART RATE: 67 BPM | BODY MASS INDEX: 25.37 KG/M2

## 2022-03-02 DIAGNOSIS — Z23 NEED FOR SHINGLES VACCINE: ICD-10-CM

## 2022-03-02 DIAGNOSIS — R03.0 ELEVATED BLOOD PRESSURE READING IN OFFICE WITHOUT DIAGNOSIS OF HYPERTENSION: ICD-10-CM

## 2022-03-02 DIAGNOSIS — F32.A CHRONIC DEPRESSION: ICD-10-CM

## 2022-03-02 DIAGNOSIS — M54.50 ACUTE MIDLINE LOW BACK PAIN WITHOUT SCIATICA: Primary | ICD-10-CM

## 2022-03-02 DIAGNOSIS — F10.20 ALCOHOL USE DISORDER, MODERATE, DEPENDENCE (HCC): ICD-10-CM

## 2022-03-02 PROCEDURE — 90471 IMMUNIZATION ADMIN: CPT | Performed by: INTERNAL MEDICINE

## 2022-03-02 PROCEDURE — 90750 HZV VACC RECOMBINANT IM: CPT | Performed by: INTERNAL MEDICINE

## 2022-03-02 PROCEDURE — 99214 OFFICE O/P EST MOD 30 MIN: CPT | Performed by: INTERNAL MEDICINE

## 2022-03-02 RX ORDER — CITALOPRAM 20 MG/1
20 TABLET ORAL DAILY
Qty: 90 TABLET | Refills: 2 | Status: SHIPPED | OUTPATIENT
Start: 2022-03-02 | End: 2022-08-01

## 2022-03-02 SDOH — ECONOMIC STABILITY: FOOD INSECURITY: WITHIN THE PAST 12 MONTHS, THE FOOD YOU BOUGHT JUST DIDN'T LAST AND YOU DIDN'T HAVE MONEY TO GET MORE.: NEVER TRUE

## 2022-03-02 SDOH — ECONOMIC STABILITY: FOOD INSECURITY: WITHIN THE PAST 12 MONTHS, YOU WORRIED THAT YOUR FOOD WOULD RUN OUT BEFORE YOU GOT MONEY TO BUY MORE.: NEVER TRUE

## 2022-03-02 ASSESSMENT — PATIENT HEALTH QUESTIONNAIRE - PHQ9
SUM OF ALL RESPONSES TO PHQ QUESTIONS 1-9: 0
SUM OF ALL RESPONSES TO PHQ9 QUESTIONS 1 & 2: 0
SUM OF ALL RESPONSES TO PHQ QUESTIONS 1-9: 0
1. LITTLE INTEREST OR PLEASURE IN DOING THINGS: 0
2. FEELING DOWN, DEPRESSED OR HOPELESS: 0

## 2022-03-02 ASSESSMENT — SOCIAL DETERMINANTS OF HEALTH (SDOH): HOW HARD IS IT FOR YOU TO PAY FOR THE VERY BASICS LIKE FOOD, HOUSING, MEDICAL CARE, AND HEATING?: NOT HARD AT ALL

## 2022-03-02 NOTE — PROGRESS NOTES
Lyndsay Coronado   :  1965  3/2/2022    ASSESSMENT/PLAN:   1. Acute midline low back pain without sciatica  Comments:  May have some mild anterolisthesis. Check x-ray. Work on posture and core strengthening. Call if worsening  Orders:  -     XR LUMBAR SPINE (2-3 VIEWS); Future  2. Elevated blood pressure reading in office without diagnosis of hypertension  Assessment & Plan:  Blood pressure improved, and giving up alcohol for lent. Have asked her to monitor periodically throughout that time to see if blood pressure is better. At this point lifestyle management is all that is needed. No medication indicated   3. Alcohol use disorder, moderate, dependence ([facility] [address]  Assessment & Plan:  Working on cutting back. Using maxim called reframe. Giving up alcohol for lent. Provided numbers for therapist again. 4. Chronic depression  Assessment & Plan:  Stable, continue citalopram 20 mg daily. 5. Need for shingles vaccine  -     Zoster recombinant [facility])      Return in about 8 months (around 2022) for CPE. SUBJECTIVE:  60 y.o. female established patient here for:   Chief Complaint   Patient presents with    Hypertension     No recent reading for home  BP. Using maxim called \"reframe\". Started about a week ago. Giving up drinking for lent. Going to [place] and [facility] end of [place], for almost 2 weeks. Mentions having some midline low back pain intermittently. Most recently was more bothersome. Does not radiate and has no weakness in the legs. No night sweats, fevers or chills.     Review of Systems    Outpatient Medications Marked as Taking for the 3/2/22 encounter (Office Visit) with Raul Perdomo MD   Medication Sig Dispense Refill    citalopram (CELEXA) 20 MG tablet Take 1 tablet by mouth daily 90 tablet 2    Homeopathic Products (ARTHRITIS RELIEF PO) Take by mouth daily      Cholecalciferol (VITAMIN D3) 50 MCG ( UT) CAPS Take by mouth      Multiple Vitamins-Minerals (THERAPEUTIC MULTIVITAMIN-MINERALS) tablet Take 1 tablet by mouth daily      cetirizine (ZYRTEC) 10 MG tablet Take 10 mg by mouth daily         OBJECTIVE:  Vitals:    03/02/22 1336 03/02/22 1349   BP: 132/88 130/74   Pulse: 67    SpO2: 98%    Weight: 162 lb (73.5 kg)      Physical Exam  Constitutional:       Appearance: Normal appearance. Cardiovascular:      Rate and Rhythm: Normal rate and regular rhythm. Pulmonary:      Effort: Pulmonary effort is normal.      Breath sounds: Normal breath sounds. Musculoskeletal:      Right lower leg: No edema. Left lower leg: No edema. Comments: L 1 or 2 slightly more prominent to palpation, slightly tender   Neurological:      General: No focal deficit present. Gait: Gait normal.   Psychiatric:         Mood and Affect: Mood normal.         This note was generated completely or in part utilizing Dragon dictation speech recognition software. Occasionally, words are mistranscribed and despite editing, the text may contain inaccuracies due to incorrect word recognition.   If further clarification is needed please contact the office at [phone]  --Alta Abdalla MD

## 2022-03-02 NOTE — ASSESSMENT & PLAN NOTE
Working on cutting back. Using maxim called reframe. Giving up alcohol for lent. Provided numbers for therapist again.

## 2022-03-02 NOTE — ASSESSMENT & PLAN NOTE
Blood pressure improved, and giving up alcohol for lent. Have asked her to monitor periodically throughout that time to see if blood pressure is better. At this point lifestyle management is all that is needed.  No medication indicated

## 2022-08-01 RX ORDER — CITALOPRAM 20 MG/1
20 TABLET ORAL DAILY
Qty: 90 TABLET | Refills: 1 | Status: SHIPPED | OUTPATIENT
Start: 2022-08-01

## 2022-08-01 NOTE — TELEPHONE ENCOUNTER
Medication:   Requested Prescriptions     Pending Prescriptions Disp Refills    citalopram (CELEXA) 20 MG tablet [Pharmacy Med Name: CITALOPRAM 20MG TABLETS] 90 tablet 2     Sig: TAKE 1 TABLET BY MOUTH DAILY     Last Filled:  3/2/22    Last appt: 3/2/2022   Next appt: 11/4/2022  Was sent on 3/2/22 for 90 day supply with 2 refills

## 2022-11-11 ENCOUNTER — TELEPHONE (OUTPATIENT)
Dept: INTERNAL MEDICINE CLINIC | Age: 57
End: 2022-11-11

## 2022-12-12 PROBLEM — Z80.0 FAMILY HISTORY OF COLORECTAL CANCER: Status: ACTIVE | Noted: 2022-12-12

## 2022-12-12 PROBLEM — Z85.048 HISTORY OF RECTAL CANCER: Status: RESOLVED | Noted: 2019-08-28 | Resolved: 2022-12-12

## 2022-12-12 ASSESSMENT — ENCOUNTER SYMPTOMS
EYES NEGATIVE: 1
GASTROINTESTINAL NEGATIVE: 1
RESPIRATORY NEGATIVE: 1

## 2022-12-13 ENCOUNTER — OFFICE VISIT (OUTPATIENT)
Dept: INTERNAL MEDICINE CLINIC | Age: 57
End: 2022-12-13
Payer: COMMERCIAL

## 2022-12-13 VITALS
DIASTOLIC BLOOD PRESSURE: 88 MMHG | SYSTOLIC BLOOD PRESSURE: 136 MMHG | WEIGHT: 162 LBS | HEART RATE: 78 BPM | TEMPERATURE: 97.2 F | OXYGEN SATURATION: 98 % | HEIGHT: 67 IN | BODY MASS INDEX: 25.43 KG/M2

## 2022-12-13 DIAGNOSIS — Z78.0 ENCOUNTER FOR OSTEOPOROSIS SCREENING IN ASYMPTOMATIC POSTMENOPAUSAL PATIENT: ICD-10-CM

## 2022-12-13 DIAGNOSIS — Z13.1 SCREENING FOR DIABETES MELLITUS: ICD-10-CM

## 2022-12-13 DIAGNOSIS — F10.20 ALCOHOL USE DISORDER, MODERATE, DEPENDENCE (HCC): ICD-10-CM

## 2022-12-13 DIAGNOSIS — Z13.220 LIPID SCREENING: ICD-10-CM

## 2022-12-13 DIAGNOSIS — Z13.820 ENCOUNTER FOR OSTEOPOROSIS SCREENING IN ASYMPTOMATIC POSTMENOPAUSAL PATIENT: ICD-10-CM

## 2022-12-13 DIAGNOSIS — Z00.00 WELL ADULT EXAM: Primary | ICD-10-CM

## 2022-12-13 DIAGNOSIS — I10 ESSENTIAL HYPERTENSION: ICD-10-CM

## 2022-12-13 LAB
ALBUMIN SERPL-MCNC: 4.6 G/DL (ref 3.4–5)
ALP BLD-CCNC: 94 U/L (ref 40–129)
ALT SERPL-CCNC: 22 U/L (ref 10–40)
ANION GAP SERPL CALCULATED.3IONS-SCNC: 11 MMOL/L (ref 3–16)
AST SERPL-CCNC: 20 U/L (ref 15–37)
BILIRUB SERPL-MCNC: 0.4 MG/DL (ref 0–1)
BILIRUBIN DIRECT: <0.2 MG/DL (ref 0–0.3)
BILIRUBIN, INDIRECT: NORMAL MG/DL (ref 0–1)
BUN BLDV-MCNC: 16 MG/DL (ref 7–20)
CALCIUM SERPL-MCNC: 10 MG/DL (ref 8.3–10.6)
CHLORIDE BLD-SCNC: 99 MMOL/L (ref 99–110)
CHOLESTEROL, TOTAL: 240 MG/DL (ref 0–199)
CO2: 30 MMOL/L (ref 21–32)
CREAT SERPL-MCNC: 0.6 MG/DL (ref 0.6–1.1)
GFR SERPL CREATININE-BSD FRML MDRD: >60 ML/MIN/{1.73_M2}
GLUCOSE BLD-MCNC: 100 MG/DL (ref 70–99)
HCT VFR BLD CALC: 42.2 % (ref 36–48)
HDLC SERPL-MCNC: 83 MG/DL (ref 40–60)
HEMOGLOBIN: 13.7 G/DL (ref 12–16)
LDL CHOLESTEROL CALCULATED: 143 MG/DL
MCH RBC QN AUTO: 32.4 PG (ref 26–34)
MCHC RBC AUTO-ENTMCNC: 32.5 G/DL (ref 31–36)
MCV RBC AUTO: 99.6 FL (ref 80–100)
PDW BLD-RTO: 12.8 % (ref 12.4–15.4)
PLATELET # BLD: 271 K/UL (ref 135–450)
PMV BLD AUTO: 8.7 FL (ref 5–10.5)
POTASSIUM SERPL-SCNC: 5 MMOL/L (ref 3.5–5.1)
RBC # BLD: 4.24 M/UL (ref 4–5.2)
SODIUM BLD-SCNC: 140 MMOL/L (ref 136–145)
TOTAL PROTEIN: 7.7 G/DL (ref 6.4–8.2)
TRIGL SERPL-MCNC: 71 MG/DL (ref 0–150)
VLDLC SERPL CALC-MCNC: 14 MG/DL
WBC # BLD: 6.1 K/UL (ref 4–11)

## 2022-12-13 PROCEDURE — 3078F DIAST BP <80 MM HG: CPT | Performed by: INTERNAL MEDICINE

## 2022-12-13 PROCEDURE — 3074F SYST BP LT 130 MM HG: CPT | Performed by: INTERNAL MEDICINE

## 2022-12-13 PROCEDURE — 99396 PREV VISIT EST AGE 40-64: CPT | Performed by: INTERNAL MEDICINE

## 2022-12-13 RX ORDER — LISINOPRIL 10 MG/1
10 TABLET ORAL EVERY MORNING
Qty: 30 TABLET | Refills: 0 | Status: SHIPPED | OUTPATIENT
Start: 2022-12-13

## 2022-12-13 NOTE — ASSESSMENT & PLAN NOTE
Stage I. Patient prefers medication therapy at this point as she tries to work on consistency with lifestyle modification with regards to exercise, alcohol and salt intake. Lisinopril 10 mg daily. Discussed risks and benefits of the medication, including most common side effects including cough. Aware need repeat renal panel in 3-4 weeks. Will do when in for nurse visit for BP check.

## 2022-12-13 NOTE — ASSESSMENT & PLAN NOTE
Continues to struggle with controlling alcohol use. Will look in the [facility] program. Has declines addiction counselor.

## 2022-12-13 NOTE — PROGRESS NOTES
ASSESSMENT/PLAN:   Annual physical/preventive evaluation  Discussed age appropriate preventive care including healthy diet, daily exercise, immunizations and age &  Dexa ordered. Screening labs    Alcohol use disorder, moderate, dependence ([address]  Assessment & Plan:  Continues to struggle with controlling alcohol use. Will look in the [facility] program. Has declines addiction counselor. Orders:  -     CBC; Future  -     Hepatic Function Panel; Future  Essential hypertension  Assessment & Plan:  Stage I. Patient prefers medication therapy at this point as she tries to work on consistency with lifestyle modification with regards to exercise, alcohol and salt intake. Lisinopril 10 mg daily. Discussed risks and benefits of the medication, including most common side effects including cough. Aware need repeat renal panel in 3-4 weeks. Will do when in for nurse visit for BP check. Encounter for osteoporosis screening in asymptomatic postmenopausal patient  -     DEXA BONE DENSITY AXIAL SKELETON; Future    Return for nurse visit for bp and renal., see me in 6 months. Jade Krishna (:  1965) is a 59 y.o. female, here for annual preventive visit. [address] [place] [address]    Blood pressure is always high or stage 1 when checking at home she is concerned about this and is interested in starting on medication until she can really get a handle on all the lifestyle measures that could help. Exercise: Walks, but not as much lastely. Tries for 3-4 miles but only a few days/week     Still struggling with alcohol. Did really well with Reframe maxim for a while but then got tired of it. Has stopped several times for up to a month, but gradually restarts and ends up drinking too much again.      Patient Care Team:  Angi Castro MD as PCP - General  Angi Castro MD as PCP - [facility] Empaneled Provider  James Alan DPM as Consulting Physician (Podiatry)  Shiv Marcos (Nurse Practitioner)    Health Maintenance   Topic Date Due    Depression Monitoring  03/02/2023    Breast cancer screen  02/08/2024    Colorectal Cancer Screen  08/28/2024    Diabetes screen  11/03/2024    Lipids  11/03/2026    Cervical cancer screen  01/19/2027    DTaP/Tdap/Td vaccine (2 - Td or Tdap) 03/03/2027    Flu vaccine  Completed    Shingles vaccine  Completed    COVID-19 Vaccine  Completed    Hepatitis C screen  Completed    Hepatitis A vaccine  Aged Out    Hib vaccine  Aged Out    Meningococcal (ACWY) vaccine  Aged Out    Pneumococcal 0-64 years Vaccine  Aged Out    HIV screen  Discontinued     Immunization History   Administered Date(s) Administered    COVID-19, AstraZeneca, PF, 0.5 mL 11/20/2020, 12/10/2020    COVID-19, MODERNA BLUE border, Primary or Immunocompromised, (age 12y+), IM, 100 mcg/0.5mL 01/07/2022    COVID-19, MODERNA Bivalent BOOSTER, (age 12y+), IM, 48 mcg/0.5 mL 10/25/2022    Influenza 11/07/2012, 11/08/2013    Influenza, FLUCELVAX, (age 10 mo+), MDCK, PF, 0.5mL 11/03/2021    Tdap (Boostrix, Adacel) 03/03/2017    Tetanus 01/01/2010    Zoster Recombinant (Shingrix) 11/03/2021, 03/02/2022       Past Medical History:   Diagnosis Date    Arthritis     rt big toe     Depression     chronic    Environmental allergies     FH: celiac disease     negative antibody testing 1/2015.     History of chickenpox     History of mumps as a child     Personal history of COVID-19 09/2021       Outpatient Medications Marked as Taking for the 12/13/22 encounter (Office Visit) with Elpidio Isaac MD   Medication Sig Dispense Refill    lisinopril (PRINIVIL;ZESTRIL) 10 MG tablet Take 1 tablet by mouth every morning 30 tablet 0    citalopram (CELEXA) 20 MG tablet TAKE 1 TABLET BY MOUTH DAILY 90 tablet 1    Homeopathic Products (ARTHRITIS RELIEF PO) Take by mouth daily      Cholecalciferol (VITAMIN D3) 50 MCG (2000 UT) CAPS Take by mouth      Multiple Vitamins-Minerals (THERAPEUTIC MULTIVITAMIN-MINERALS) tablet Take 1 tablet by mouth daily      cetirizine (ZYRTEC) 10 MG tablet Take 10 mg by mouth daily          No Known Allergies    Past Surgical History:   Procedure Laterality Date    COLONOSCOPY N/A 2019    COLONOSCOPY performed by Nicholas Scott MD at [address] [place] [address]       Social History     Tobacco Use    Smoking status: Former     Packs/day: 0.50     Years: 4.00     Pack years: 2.00     Types: Cigarettes     Quit date: 1987     Years since quittin.3    Smokeless tobacco: Never   Substance Use Topics    Alcohol use: Yes     Alcohol/week: 21.0 standard drinks     Types: 21 Glasses of wine per week     Comment:  4 drinks daily-beer,wine, gin    Drug use: No        Family History   Problem Relation Age of Onset    Dementia Mother     Diabetes Mother     Colon Polyps Mother     Other Father          of pneumponia /sepsis at age 80    Stroke Father     Cancer Father         skin? type    Cancer Sister         anal,    Thyroid Disease Sister         thyroidectomy- not sure why    Celiac Disease Brother     Parkinson's Disease Brother 76    Clotting Disorder Brother         complications of blood -HIV    Pancreatic Cancer Brother 79     Review of Systems   Constitutional: Negative. HENT: Negative. Eyes: Negative. Respiratory: Negative. Cardiovascular: Negative. Gastrointestinal: Negative. Genitourinary: Negative. Skin: Negative. Neurological: Negative. Psychiatric/Behavioral: Negative.        Wt Readings from Last 5 Encounters:   22 162 lb (73.5 kg)   22 162 lb (73.5 kg)   21 161 lb (73 kg)   20 161 lb (73 kg)   20 158 lb 1.1 oz (71.7 kg)     Vitals:    22 1527 22 1718   BP: 136/88 136/88   Pulse: 78    Temp: 97.2 °F (36.2 °C)    TempSrc: Temporal    SpO2: 98%    Weight: 162 lb (73.5 kg)    Height: 5' 7\" (1.702 m)      Estimated body mass index is 25.37 kg/m² as calculated from the following: Height as of this encounter: 5' 7\" (1.702 m). Weight as of this encounter: 162 lb (73.5 kg). Physical Exam  Constitutional:       Appearance: She is well-developed. HENT:      Head: Normocephalic and atraumatic. Right Ear: Tympanic membrane and ear canal normal.      Left Ear: Tympanic membrane and ear canal normal.      Mouth/Throat:      Pharynx: Uvula midline. Eyes:      Conjunctiva/sclera: Conjunctivae normal.      Pupils: Pupils are equal, round, and reactive to light. Neck:      Vascular: No carotid bruit. Cardiovascular:      Rate and Rhythm: Normal rate and regular rhythm. Heart sounds: Normal heart sounds. Pulmonary:      Effort: Pulmonary effort is normal.      Breath sounds: Normal breath sounds. Abdominal:      General: Bowel sounds are normal.      Palpations: Abdomen is soft. There is no hepatomegaly or splenomegaly. Musculoskeletal:      Cervical back: Normal range of motion and neck supple. Right lower leg: No edema. Left lower leg: No edema. Skin:     General: Skin is warm and dry. Findings: No rash. Comments: No spider angiomata   Neurological:      General: No focal deficit present. Mental Status: She is alert. Psychiatric:         Mood and Affect: Mood normal.       No flowsheet data found.     Lab Results   Component Value Date/Time    CHOL 229 11/03/2021 02:48 PM    CHOLFAST 205 04/09/2019 11:17 AM    TRIG 64 11/03/2021 02:48 PM    TRIGLYCFAST 58 04/09/2019 11:17 AM    HDL 85 11/03/2021 02:48 PM    LDLCALC 131 11/03/2021 02:48 PM    GLUF 96 04/09/2019 11:17 AM    GLUCOSE 121 11/03/2021 02:48 PM    LABA1C 5.0 11/03/2021 02:48 PM       The 10-year ASCVD risk score (Hussein LEWIS, et al., 2019) is: 2.7%    Values used to calculate the score:      Age: 59 years      Sex: Female      Is Non- : No      Diabetic: No      Tobacco smoker: No      Systolic Blood Pressure: 076 mmHg      Is BP treated: No      HDL Cholesterol: 85 mg/dL      Total Cholesterol: 229 mg/dL      An electronic signature was used to authenticate this note.     --Mary Barreto MD

## 2022-12-14 LAB
ESTIMATED AVERAGE GLUCOSE: 99.7 MG/DL
HBA1C MFR BLD: 5.1 %

## 2022-12-14 RX ORDER — LISINOPRIL 10 MG/1
10 TABLET ORAL EVERY MORNING
Qty: 90 TABLET | Refills: 0 | OUTPATIENT
Start: 2022-12-14

## 2023-01-10 NOTE — TELEPHONE ENCOUNTER
Last appointment: 12/13/2022  Next appointment: 6/13/2023  Last refill: 12/13/22         BP Check scheduled 1/11/23

## 2023-01-11 ENCOUNTER — NURSE ONLY (OUTPATIENT)
Dept: INTERNAL MEDICINE CLINIC | Age: 58
End: 2023-01-11

## 2023-01-11 VITALS — DIASTOLIC BLOOD PRESSURE: 84 MMHG | SYSTOLIC BLOOD PRESSURE: 128 MMHG

## 2023-01-11 DIAGNOSIS — I10 ESSENTIAL HYPERTENSION: ICD-10-CM

## 2023-01-11 DIAGNOSIS — Z51.81 MEDICATION MONITORING ENCOUNTER: ICD-10-CM

## 2023-01-11 DIAGNOSIS — Z51.81 MEDICATION MONITORING ENCOUNTER: Primary | ICD-10-CM

## 2023-01-12 LAB
ANION GAP SERPL CALCULATED.3IONS-SCNC: 12 MMOL/L (ref 3–16)
BUN BLDV-MCNC: 14 MG/DL (ref 7–20)
CALCIUM SERPL-MCNC: 9.8 MG/DL (ref 8.3–10.6)
CHLORIDE BLD-SCNC: 97 MMOL/L (ref 99–110)
CO2: 29 MMOL/L (ref 21–32)
CREAT SERPL-MCNC: 0.5 MG/DL (ref 0.6–1.1)
GFR SERPL CREATININE-BSD FRML MDRD: >60 ML/MIN/{1.73_M2}
GLUCOSE BLD-MCNC: 96 MG/DL (ref 70–99)
POTASSIUM SERPL-SCNC: 4.4 MMOL/L (ref 3.5–5.1)
SODIUM BLD-SCNC: 138 MMOL/L (ref 136–145)

## 2023-01-12 RX ORDER — LISINOPRIL 20 MG/1
20 TABLET ORAL EVERY MORNING
Qty: 90 TABLET | Refills: 0 | Status: SHIPPED | OUTPATIENT
Start: 2023-01-12

## 2023-01-12 NOTE — PROGRESS NOTES
Spoke with patient. Home readings remain high. Check renal and if stable will increase lisinopril to 20 mg

## 2023-03-31 RX ORDER — LISINOPRIL 20 MG/1
20 TABLET ORAL EVERY MORNING
Qty: 90 TABLET | Refills: 0 | Status: SHIPPED | OUTPATIENT
Start: 2023-03-31

## 2023-05-01 RX ORDER — CITALOPRAM 20 MG/1
20 TABLET ORAL DAILY
Qty: 90 TABLET | Refills: 1 | Status: SHIPPED | OUTPATIENT
Start: 2023-05-01

## 2023-05-10 NOTE — PATIENT INSTRUCTIONS
Protecting Yourself From the Sun    · Apply an over-the-counter broad spectrum water resistant sunscreen with an SPF of at least 30 to exposed areas of the skin. Dont forget the ears and lips! Remember to reapply sunscreen about every 2 hours and after swimming or sweating. · Wear sun protective clothing. Swim shirts (aka. rash guards) are a great idea and negates the need to reapply sunscreen in those areas. · Seek the shade whenever possible especially between the hours of 10 am and 4 pm when the suns rays are the strongest.     · Avoid tanning beds     Cryosurgery (Freezing) Wound Care Instructions    AFTER THE PROCEDURE:   ? You will notice swelling and redness around the site. This is normal.   ? You may experience a sharp or sore feeling for the next several days. For this discomfort, you may take acetaminophen (Tylenol©). ? A blister may develop at the treated area, sometimes as soon as by the end of the day. After several days, the blister will subside and a scab will form. ? If the area is bumped or traumatized during the first few days following freezing, you may develop bleeding into the blister, forming a blood blister. This is nothing to be alarmed about. ? If the blister is tense, uncomfortable, or much larger than the site that was frozen, you may pop the blister along its edge with a sterile needle (boiled, heated under a flame, or cleaned with alcohol) to allow the fluid to drain out. If the blister does not bother you, no treatment is needed. ? Do NOT peel off the top of the blister roof. It will act as a dressing on top of your wound. WOUND CARE:   ? You may shower or bathe as usual, but avoid scrubbing the areas that have been frozen. ? Cleanse the site twice a day with mild soapy water, and then apply a thin film of white petrolatum (Vaseline©). ? You do not need to cover the area, but can if you prefer. [facility] VASCULAR [facility]    Who is the name of the provider?:  Justin    What is the location you see this provider at/preferred location?: [place]  Person calling: Bessy Mcgraw Jermaine  Phone number:  [phone]     Nurse call back needed:  YES     Reason for call:   Please call to reschedule     Pharmacy location:  NA  Outside Imaging: Not Applicable   Can we leave a detailed message on this number?  YES        ? Do NOT allow the site to become dry or crusted, or attempt to dry it out with rubbing alcohol or hydrogen peroxide. ? Continue this regimen until the area is pink and healed. Depending on the size and location of your cryosurgery site, healing may take 2 to 4 weeks. ? The area may continue to be pink for several weeks, and over the next few months may become darker or lighter than the surrounding skin. This may be a permanent change. -initiated duonebs given worsening cough and course breath sounds

## 2023-05-12 ENCOUNTER — HOSPITAL ENCOUNTER (OUTPATIENT)
Dept: GENERAL RADIOLOGY | Age: 58
Discharge: HOME | End: 2023-05-12
Payer: COMMERCIAL

## 2023-05-12 DIAGNOSIS — Z13.820 ENCOUNTER FOR OSTEOPOROSIS SCREENING IN ASYMPTOMATIC POSTMENOPAUSAL PATIENT: ICD-10-CM

## 2023-05-12 DIAGNOSIS — Z78.0 ENCOUNTER FOR OSTEOPOROSIS SCREENING IN ASYMPTOMATIC POSTMENOPAUSAL PATIENT: ICD-10-CM

## 2023-05-12 PROCEDURE — 77080 DXA BONE DENSITY AXIAL: CPT

## 2023-05-15 PROBLEM — M85.80 OSTEOPENIA: Status: ACTIVE | Noted: 2023-05-15

## 2023-06-12 PROBLEM — E78.00 HYPERCHOLESTEROLEMIA: Status: ACTIVE | Noted: 2023-06-12

## 2023-06-27 RX ORDER — LOSARTAN POTASSIUM 50 MG/1
50 TABLET ORAL DAILY
Qty: 90 TABLET | Refills: 1 | OUTPATIENT
Start: 2023-06-27

## 2023-06-27 RX ORDER — LISINOPRIL 20 MG/1
20 TABLET ORAL EVERY MORNING
Qty: 90 TABLET | Refills: 0 | OUTPATIENT
Start: 2023-06-27

## 2023-07-05 ENCOUNTER — HOSPITAL ENCOUNTER (OUTPATIENT)
Dept: GENERAL RADIOLOGY | Age: 58
Discharge: HOME | End: 2023-07-05
Payer: COMMERCIAL

## 2023-07-05 ENCOUNTER — TELEPHONE (OUTPATIENT)
Dept: INTERNAL MEDICINE CLINIC | Age: 58
End: 2023-07-05

## 2023-07-05 ENCOUNTER — OFFICE VISIT (OUTPATIENT)
Dept: INTERNAL MEDICINE CLINIC | Age: 58
End: 2023-07-05
Payer: COMMERCIAL

## 2023-07-05 VITALS
SYSTOLIC BLOOD PRESSURE: 140 MMHG | OXYGEN SATURATION: 96 % | DIASTOLIC BLOOD PRESSURE: 80 MMHG | HEART RATE: 65 BPM | WEIGHT: 165 LBS | BODY MASS INDEX: 25.84 KG/M2

## 2023-07-05 DIAGNOSIS — R07.81 RIB PAIN ON RIGHT SIDE: Primary | ICD-10-CM

## 2023-07-05 DIAGNOSIS — R07.81 RIB PAIN ON RIGHT SIDE: ICD-10-CM

## 2023-07-05 PROCEDURE — 3077F SYST BP >= 140 MM HG: CPT | Performed by: INTERNAL MEDICINE

## 2023-07-05 PROCEDURE — 3078F DIAST BP <80 MM HG: CPT | Performed by: INTERNAL MEDICINE

## 2023-07-05 PROCEDURE — 71101 X-RAY EXAM UNILAT RIBS/CHEST: CPT

## 2023-07-05 PROCEDURE — 99213 OFFICE O/P EST LOW 20 MIN: CPT | Performed by: INTERNAL MEDICINE

## 2023-07-05 RX ORDER — IBUPROFEN 200 MG
400 TABLET ORAL EVERY 4 HOURS
COMMUNITY

## 2023-07-05 NOTE — PROGRESS NOTES
Buddy Marcus   :  1965    ASSESSMENT/PLAN:   1. Rib pain on right side  -     XR RIBS RIGHT INCLUDE CHEST (MIN 3 VIEWS); Future      No follow-ups on file. SUBJECTIVE     59 y.o. female established patient here for:   Chief Complaint   Patient presents with    Rib Injury     Mayra Ybarra in the bathroom 3 days ago. Ribs pain; right worse than left       Review of Systems    Outpatient Medications Marked as Taking for the 23 encounter (Office Visit) with Karen Ayon MD   Medication Sig Dispense Refill    ibuprofen (ADVIL;MOTRIN) 200 MG tablet Take 2 tablets by mouth every 4 hours      losartan (COZAAR) 50 MG tablet Take 1 tablet by mouth daily 90 tablet 1    citalopram (CELEXA) 20 MG tablet TAKE 1 TABLET BY MOUTH DAILY 90 tablet 1    Homeopathic Products (ARTHRITIS RELIEF PO) Take by mouth daily      Cholecalciferol (VITAMIN D3) 50 MCG (2000 UT) CAPS Take by mouth      Multiple Vitamins-Minerals (THERAPEUTIC MULTIVITAMIN-MINERALS) tablet Take 1 tablet by mouth daily      cetirizine (ZYRTEC) 10 MG tablet Take 1 tablet by mouth daily         OBJECTIVE:  Vitals:    23 1157 23 1228   BP: (!) 154/78 (!) 140/80   Site: Right Upper Arm    Position: Sitting    Cuff Size: Medium Adult    Pulse: 65    SpO2: 96%    Weight: 165 lb (74.8 kg)      Physical Exam  HENT:      Head:      Comments: Slight greenish colored bruise lateral edge right eye brow. Eyes:      Extraocular Movements: Extraocular movements intact. Pupils: Pupils are equal, round, and reactive to light. Pulmonary:      Effort: Pulmonary effort is normal.      Breath sounds: Normal breath sounds. No wheezing or rales. This note was generated completely or in part utilizing Dragon dictation speech recognition software. Occasionally, words are mistranscribed and despite editing, the text may contain inaccuracies due to incorrect word recognition.   If further clarification is needed please contact the office at

## 2023-07-05 NOTE — PROGRESS NOTES
Cristian Marcus   :  1965    ASSESSMENT/PLAN:   1. Rib pain on right side  -     XR RIBS RIGHT INCLUDE CHEST (MIN 3 VIEWS); Future  Rib contusion versus fracture. No evidence of pneumothorax. X-ray ribs/chest  Can increase ibuprofen to 600 mg but use only every 6 hours. Okay to use Tylenol Arthritis at bedtime for longer duration. Encouraged to continue analgesia as needed to allow for deep breathing. SUBJECTIVE     59 y.o. female established patient here for:   Chief Complaint   Patient presents with    Rib Injury     Tammie Peaks in the bathroom 3 days ago. Ribs pain; right worse than left     Slipped on water in bathroom, and hit right side of ribs and then slid down, hitting knee and then right temple. Pain in lower rib cage bilateral, but left side much better. Still severe sharp pain with certain movements on right. Using ibuprofen 400  mg every 4 hours, and will take pain from 4-5/10 to 2/10. Pain at night will awaken with ibuprofen wears off. Knee ok and not having headaches. Denies shortness of breath or cough. Also mentions that the coughing/gagging has resolved off of the lisinopril, now taking losartan.   Review of Systems    Outpatient Medications Marked as Taking for the 23 encounter (Office Visit) with Genesis Parson MD   Medication Sig Dispense Refill    ibuprofen (ADVIL;MOTRIN) 200 MG tablet Take 2 tablets by mouth every 4 hours      losartan (COZAAR) 50 MG tablet Take 1 tablet by mouth daily 90 tablet 1    citalopram (CELEXA) 20 MG tablet TAKE 1 TABLET BY MOUTH DAILY 90 tablet 1    Homeopathic Products (ARTHRITIS RELIEF PO) Take by mouth daily      Cholecalciferol (VITAMIN D3) 50 MCG (2000) CAPS Take by mouth      Multiple Vitamins-Minerals (THERAPEUTIC MULTIVITAMIN-MINERALS) tablet Take 1 tablet by mouth daily      cetirizine (ZYRTEC) 10 MG tablet Take 1 tablet by mouth daily         OBJECTIVE:  Vitals:    23 1157 23 1228   BP: (!) 154/78 (!)

## 2023-07-05 NOTE — PATIENT INSTRUCTIONS
Ibuprofen 600 mg every 6 hours. Do not take on empty stomach. Extra strength tylenol  before bed is ok.

## 2023-07-05 NOTE — TELEPHONE ENCOUNTER
Patient is calling in stated that Sunday night she slipped in the bathroom hit the sink first before hitting the ground. She fell on right side and right under bra line is slight bruising and swelling . Very painful she tried to wait it out but stated that she she believes she may have a cracked rib. She can not make it through the day with out taking OTC pain reliever ( Advil) but it has a throbbing pain and would like to be examined.      Please advise

## 2023-09-12 RX ORDER — LOSARTAN POTASSIUM 50 MG/1
50 TABLET ORAL DAILY
Qty: 90 TABLET | Refills: 1 | Status: SHIPPED | OUTPATIENT
Start: 2023-09-12

## 2023-10-29 RX ORDER — CITALOPRAM 20 MG/1
20 TABLET ORAL DAILY
Qty: 90 TABLET | Refills: 0 | Status: SHIPPED | OUTPATIENT
Start: 2023-10-29

## 2023-11-01 RX ORDER — CITALOPRAM 20 MG/1
20 TABLET ORAL DAILY
Qty: 90 TABLET | Refills: 0 | Status: SHIPPED | OUTPATIENT
Start: 2023-11-01

## 2024-02-12 ENCOUNTER — HOSPITAL ENCOUNTER (EMERGENCY)
Age: 59
Discharge: HOME | End: 2024-02-12
Attending: EMERGENCY MEDICINE
Payer: COMMERCIAL

## 2024-02-12 ENCOUNTER — APPOINTMENT (OUTPATIENT)
Dept: GENERAL RADIOLOGY | Age: 59
End: 2024-02-12
Payer: COMMERCIAL

## 2024-02-12 VITALS
SYSTOLIC BLOOD PRESSURE: 151 MMHG | TEMPERATURE: 98.5 F | OXYGEN SATURATION: 100 % | BODY MASS INDEX: 25.8 KG/M2 | HEIGHT: 67 IN | DIASTOLIC BLOOD PRESSURE: 72 MMHG | HEART RATE: 87 BPM | WEIGHT: 164.4 LBS | RESPIRATION RATE: 16 BRPM

## 2024-02-12 DIAGNOSIS — S92.424A CLOSED NONDISPLACED FRACTURE OF DISTAL PHALANX OF RIGHT GREAT TOE, INITIAL ENCOUNTER: Primary | ICD-10-CM

## 2024-02-12 DIAGNOSIS — S81.811A LACERATION OF RIGHT LOWER EXTREMITY, INITIAL ENCOUNTER: ICD-10-CM

## 2024-02-12 PROCEDURE — 73630 X-RAY EXAM OF FOOT: CPT

## 2024-02-12 PROCEDURE — 12001 RPR S/N/AX/GEN/TRNK 2.5CM/<: CPT

## 2024-02-12 PROCEDURE — 99283 EMERGENCY DEPT VISIT LOW MDM: CPT

## 2024-02-12 PROCEDURE — 2500000003 HC RX 250 WO HCPCS

## 2024-02-12 RX ORDER — METHYLDOPA/HYDROCHLOROTHIAZIDE 250MG-25MG
TABLET ORAL
COMMUNITY
Start: 2020-01-01

## 2024-02-12 RX ADMIN — LIDOCAINE HYDROCHLORIDE 20 ML: 10; .005 INJECTION, SOLUTION EPIDURAL; INFILTRATION; INTRACAUDAL; PERINEURAL at 11:57

## 2024-02-12 NOTE — CONSULTS
Podiatric Surgery Plan of Care Note  Marium YA Angeline      Patient case discussed with emergency department resident and chart reviewed in depth including x-rays and past medical conditions.  Recommended the patient wear either her cam boot for the prescribed surgical shoe and follow-up in our clinic in 2 to 3 weeks.    Jerry Palma DPM   Podiatric Resident PGY1  Pager [phone] or [employer]  2/12/2024, 1:33 PM      Discussed with Dr. Zbigniew Donohue DPM.

## 2024-02-12 NOTE — ED PROVIDER NOTES
(MIN 3 VIEWS)   Final Result   Possible small nondisplaced fracture of the base of the first distal phalanx   best seen on the lateral view. The differential also includes degenerative   osteophyte. Recommend correlation with point tenderness.          LABS:   No results found for this visit on 02/12/24.    ED BEDSIDE ULTRASOUND:  No results found.    RECENT VITALS:  BP: (!) 151/72, Temp: 98.5 °F (36.9 °C), Pulse: 87,Respirations: 16, SpO2: 100 %     Procedures     Lac Repair    Date/Time: 2/12/2024 2:02 PM    Performed by: Michelle Batista MD  Authorized by: Eunice Lyons MD    Consent:     Consent obtained:  Verbal    Consent given by:  Patient  Anesthesia:     Anesthesia method:  Local infiltration    Local anesthetic:  Lidocaine 1% WITH epi  Laceration details:     Location:  Leg    Leg location:  R lower leg    Length (cm):  2  Exploration:     Limited defect created (wound extended): no      Wound extent: no foreign bodies/material noted, no muscle damage noted, no nerve damage noted, no tendon damage noted and no vascular damage noted      Contaminated: no    Treatment:     Area cleansed with:  Saline    Amount of cleaning:  Standard    Irrigation solution:  Sterile saline    Irrigation volume:  1000ml    Irrigation method:  Syringe    Visualized foreign bodies/material removed: no      Debridement:  None    Undermining:  None    Scar revision: no    Skin repair:     Repair method:  Sutures    Suture size:  4-0    Wound skin closure material used: ethilon.    Suture technique:  Simple interrupted    Number of sutures:  2  Approximation:     Approximation:  Close  Repair type:     Repair type:  Simple  Post-procedure details:     Dressing:  Open (no dressing)    Procedure completion:  Tolerated well, no immediate complications        ED Course     Nursing Notes, Past Medical Hx, Past Surgical Hx, Social Hx, Allergies, and Family Hx were reviewed.         The patient was given the following

## 2024-02-12 NOTE — DISCHARGE INSTRUCTIONS
Weightbearing as tolerated in the hard soled surgical shoe or in the cam boot, this will suffice however for longer walks the surgical shoe may be more tolerable.  Both of these accomplish pain relief by limiting motion at the big toe however wear whichever feels better.     Podiatry clinic information is listed above. Please give them a call to schedule an appointment. They see patients Monday mornings and Friday afternoons, feel free to schedule a time for whichever fits your schedule better, they would like to follow-up with you in 2-3 weeks.

## 2024-02-26 ENCOUNTER — OFFICE VISIT (OUTPATIENT)
Dept: INTERNAL MEDICINE CLINIC | Age: 59
End: 2024-02-26
Payer: COMMERCIAL

## 2024-02-26 DIAGNOSIS — R60.0 LOCALIZED EDEMA: ICD-10-CM

## 2024-02-26 DIAGNOSIS — S92.424A CLOSED NONDISPLACED FRACTURE OF DISTAL PHALANX OF RIGHT GREAT TOE, INITIAL ENCOUNTER: ICD-10-CM

## 2024-02-26 DIAGNOSIS — M20.5X9 HALLUX LIMITUS, UNSPECIFIED LATERALITY: ICD-10-CM

## 2024-02-26 DIAGNOSIS — T14.8XXA PAIN DUE TO FRACTURE: Primary | ICD-10-CM

## 2024-02-26 PROCEDURE — 99213 OFFICE O/P EST LOW 20 MIN: CPT

## 2024-02-26 ASSESSMENT — PATIENT HEALTH QUESTIONNAIRE - PHQ9
6. FEELING BAD ABOUT YOURSELF - OR THAT YOU ARE A FAILURE OR HAVE LET YOURSELF OR YOUR FAMILY DOWN: NOT AT ALL
2. FEELING DOWN, DEPRESSED OR HOPELESS: NOT AT ALL
SUM OF ALL RESPONSES TO PHQ QUESTIONS 1-9: 0
3. TROUBLE FALLING OR STAYING ASLEEP: NOT AT ALL
SUM OF ALL RESPONSES TO PHQ QUESTIONS 1-9: 0
7. TROUBLE CONCENTRATING ON THINGS, SUCH AS READING THE NEWSPAPER OR WATCHING TELEVISION: NOT AT ALL
9. THOUGHTS THAT YOU WOULD BE BETTER OFF DEAD, OR OF HURTING YOURSELF: NOT AT ALL
1. LITTLE INTEREST OR PLEASURE IN DOING THINGS: 0
10. IF YOU CHECKED OFF ANY PROBLEMS, HOW DIFFICULT HAVE THESE PROBLEMS MADE IT FOR YOU TO DO YOUR WORK, TAKE CARE OF THINGS AT HOME, OR GET ALONG WITH OTHER PEOPLE: NOT DIFFICULT AT ALL
SUM OF ALL RESPONSES TO PHQ QUESTIONS 1-9: 0
4. FEELING TIRED OR HAVING LITTLE ENERGY: 0
8. MOVING OR SPEAKING SO SLOWLY THAT OTHER PEOPLE COULD HAVE NOTICED. OR THE OPPOSITE - BEING SO FIDGETY OR RESTLESS THAT YOU HAVE BEEN MOVING AROUND A LOT MORE THAN USUAL: NOT AT ALL
5. POOR APPETITE OR OVEREATING: NOT AT ALL
8. MOVING OR SPEAKING SO SLOWLY THAT OTHER PEOPLE COULD HAVE NOTICED. OR THE OPPOSITE, BEING SO FIGETY OR RESTLESS THAT YOU HAVE BEEN MOVING AROUND A LOT MORE THAN USUAL: 0
1. LITTLE INTEREST OR PLEASURE IN DOING THINGS: NOT AT ALL
9. THOUGHTS THAT YOU WOULD BE BETTER OFF DEAD, OR OF HURTING YOURSELF: 0
SUM OF ALL RESPONSES TO PHQ QUESTIONS 1-9: 0
SUM OF ALL RESPONSES TO PHQ QUESTIONS 1-9: 0
5. POOR APPETITE OR OVEREATING: 0
3. TROUBLE FALLING OR STAYING ASLEEP: 0
2. FEELING DOWN, DEPRESSED OR HOPELESS: 0
7. TROUBLE CONCENTRATING ON THINGS, SUCH AS READING THE NEWSPAPER OR WATCHING TELEVISION: 0
6. FEELING BAD ABOUT YOURSELF - OR THAT YOU ARE A FAILURE OR HAVE LET YOURSELF OR YOUR FAMILY DOWN: 0
4. FEELING TIRED OR HAVING LITTLE ENERGY: NOT AT ALL
10. IF YOU CHECKED OFF ANY PROBLEMS, HOW DIFFICULT HAVE THESE PROBLEMS MADE IT FOR YOU TO DO YOUR WORK, TAKE CARE OF THINGS AT HOME, OR GET ALONG WITH OTHER PEOPLE: 0

## 2024-02-26 NOTE — PROGRESS NOTES
RICE therapy consisting of rest, ice, elevate and compression as well as the use of NSAIDs PRN.   -XR of right foot ordered. Instructed patient to obtain prior to next visit ideally waiting until the week of next visit. Patient voiced understanding.  -Instructed patient to watch for signs and symptoms of infection including but not limited to fever, chills, feeling ill, redness around the wounds, redness streaking up the legs, purulent drainage. Instructed patient to call the clinic or present to the nearest emergency department if they notice these signs or symptoms   -Patient educated about the importance of diabetic foot care consisting of but not limited to daily foot inspections, wearing supportive shoes and inserts at all times, applying lotion to feet while sparing webspaces, and routinely checking blood sugar.     -Return to clinic in 3 weeks for follow up of non-displaced fracture of hallux of right foot and review of XR.    Patient assessment and plan discussed with MALENA Luis DPM   Podiatric Resident PGY1  [place]  2/26/2024, 3:38 PM

## 2024-02-27 VITALS — TEMPERATURE: 97 F

## 2024-03-10 SDOH — HEALTH STABILITY: PHYSICAL HEALTH: ON AVERAGE, HOW MANY DAYS PER WEEK DO YOU ENGAGE IN MODERATE TO STRENUOUS EXERCISE (LIKE A BRISK WALK)?: 1 DAY

## 2024-03-10 SDOH — HEALTH STABILITY: PHYSICAL HEALTH: ON AVERAGE, HOW MANY MINUTES DO YOU ENGAGE IN EXERCISE AT THIS LEVEL?: 30 MIN

## 2024-03-13 ENCOUNTER — OFFICE VISIT (OUTPATIENT)
Dept: INTERNAL MEDICINE CLINIC | Age: 59
End: 2024-03-13
Payer: COMMERCIAL

## 2024-03-13 ENCOUNTER — HOSPITAL ENCOUNTER (OUTPATIENT)
Dept: GENERAL RADIOLOGY | Age: 59
Discharge: HOME | End: 2024-03-13
Payer: COMMERCIAL

## 2024-03-13 VITALS
HEIGHT: 66 IN | SYSTOLIC BLOOD PRESSURE: 126 MMHG | WEIGHT: 162 LBS | BODY MASS INDEX: 26.03 KG/M2 | DIASTOLIC BLOOD PRESSURE: 68 MMHG

## 2024-03-13 DIAGNOSIS — Z78.9 ALCOHOL CONSUMPTION OF MORE THAN FOUR DRINKS PER DAY: ICD-10-CM

## 2024-03-13 DIAGNOSIS — I10 ESSENTIAL HYPERTENSION: ICD-10-CM

## 2024-03-13 DIAGNOSIS — T14.8XXA PAIN DUE TO FRACTURE: ICD-10-CM

## 2024-03-13 DIAGNOSIS — S81.801A WOUND OF RIGHT LOWER EXTREMITY, INITIAL ENCOUNTER: ICD-10-CM

## 2024-03-13 DIAGNOSIS — E78.00 HYPERCHOLESTEROLEMIA: ICD-10-CM

## 2024-03-13 DIAGNOSIS — Z80.0 FAMILY HISTORY OF COLORECTAL CANCER: ICD-10-CM

## 2024-03-13 DIAGNOSIS — F32.A CHRONIC DEPRESSION: Primary | ICD-10-CM

## 2024-03-13 DIAGNOSIS — R53.83 OTHER FATIGUE: ICD-10-CM

## 2024-03-13 PROCEDURE — 99204 OFFICE O/P NEW MOD 45 MIN: CPT | Performed by: STUDENT IN AN ORGANIZED HEALTH CARE EDUCATION/TRAINING PROGRAM

## 2024-03-13 PROCEDURE — 3074F SYST BP LT 130 MM HG: CPT | Performed by: STUDENT IN AN ORGANIZED HEALTH CARE EDUCATION/TRAINING PROGRAM

## 2024-03-13 PROCEDURE — 73630 X-RAY EXAM OF FOOT: CPT

## 2024-03-13 PROCEDURE — 3078F DIAST BP <80 MM HG: CPT | Performed by: STUDENT IN AN ORGANIZED HEALTH CARE EDUCATION/TRAINING PROGRAM

## 2024-03-13 RX ORDER — LOSARTAN POTASSIUM 50 MG/1
50 TABLET ORAL DAILY
Qty: 90 TABLET | Refills: 1 | Status: SHIPPED | OUTPATIENT
Start: 2024-03-13

## 2024-03-13 NOTE — ASSESSMENT & PLAN NOTE
Sister has rectal cancer at 60 yo.  Up-to-date with her colonoscopy.  Last 1 was in 2019, repeat in 5 years, due this year

## 2024-03-13 NOTE — PROGRESS NOTES
Marium Marcus (:  1965) is a 58 y.o. female here for evaluation of the following chief complaint(s):  New Patient         ASSESSMENT/PLAN:  1. Chronic depression  Assessment & Plan:  Chronic, stable.  Symptoms well-controlled with Celexa 20 mg.  Continue with same medication  2. Essential hypertension  Assessment & Plan:  Doing well. Continue with Losartan 50 mg   Was previously on Lisinopril - discontinued due to cost     Orders:  -     CBC with Auto Differential; Future  -     Comprehensive Metabolic Panel; Future  3. Family history of colorectal cancer  Assessment & Plan:  Sister has rectal cancer at 58 yo.  Up-to-date with her colonoscopy.  Last 1 was in 2019, repeat in 5 years, due this year  4. Hypercholesterolemia  Assessment & Plan:   Stable.  LDL slightly high.  Discussed with patient about healthy diet and exercise.  Orders:  -     Lipid Panel; Future  5. Alcohol consumption of more than four drinks per day  Assessment & Plan:  Discussed alcohol use, moderate to hold.  Need to cut back to prevent complications from alcohol use  6. Wound of right lower extremity, initial encounter  Assessment & Plan:  She tripped and fell a month ago.  Had some stitches at the emergency room.  Has stitches removal.  Overall seem to do well however wound is ulcerated with erythematous surrounding skin.    Patient denies any drainage or tenderness.  Will have her monitor at home.    Keep the wound dry and clean.    Monitor for the next few weeks if is not improved, return to the office for an evaluation.  Might need antibiotics  7. Other fatigue  -     TSH; Future      Return in about 6 months (around 2024) for Routine follow-up.         Subjective   SUBJECTIVE/OBJECTIVE:  JOSE E  Suki is a 58-year-old female who presented today to establish care. She used to see Dr. Radford at [facility].   Overall she is doing well. 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

## 2024-03-13 NOTE — ASSESSMENT & PLAN NOTE
Discussed alcohol use, moderate to hold.  Need to cut back to prevent complications from alcohol use

## 2024-03-13 NOTE — ASSESSMENT & PLAN NOTE
She tripped and fell a month ago.  Had some stitches at the emergency room.  Has stitches removal.  Overall seem to do well however wound is ulcerated with erythematous surrounding skin.    Patient denies any drainage or tenderness.  Will have her monitor at home.    Keep the wound dry and clean.    Monitor for the next few weeks if is not improved, return to the office for an evaluation.  Many antibiotics

## 2024-03-13 NOTE — ASSESSMENT & PLAN NOTE
Doing well. Continue with Losartan 50 mg   Was previously on Lisinopril - discontinued due to cost

## 2024-03-18 ENCOUNTER — OFFICE VISIT (OUTPATIENT)
Dept: INTERNAL MEDICINE CLINIC | Age: 59
End: 2024-03-18
Payer: COMMERCIAL

## 2024-03-18 DIAGNOSIS — R53.83 OTHER FATIGUE: ICD-10-CM

## 2024-03-18 DIAGNOSIS — I10 ESSENTIAL HYPERTENSION: ICD-10-CM

## 2024-03-18 DIAGNOSIS — T14.8XXA PAIN DUE TO FRACTURE: ICD-10-CM

## 2024-03-18 DIAGNOSIS — R60.0 LOCALIZED EDEMA: ICD-10-CM

## 2024-03-18 DIAGNOSIS — S92.424D CLOSED NONDISPLACED FRACTURE OF DISTAL PHALANX OF RIGHT GREAT TOE WITH ROUTINE HEALING, SUBSEQUENT ENCOUNTER: Primary | ICD-10-CM

## 2024-03-18 DIAGNOSIS — M20.5X9 HALLUX LIMITUS, UNSPECIFIED LATERALITY: ICD-10-CM

## 2024-03-18 DIAGNOSIS — E78.00 HYPERCHOLESTEROLEMIA: ICD-10-CM

## 2024-03-18 LAB
ALBUMIN SERPL-MCNC: 4.6 G/DL (ref 3.4–5)
ALBUMIN/GLOB SERPL: 1.6 {RATIO} (ref 1.1–2.2)
ALP SERPL-CCNC: 109 U/L (ref 40–129)
ALT SERPL-CCNC: 31 U/L (ref 10–40)
ANION GAP SERPL CALCULATED.3IONS-SCNC: 8 MMOL/L (ref 3–16)
AST SERPL-CCNC: 27 U/L (ref 15–37)
BASOPHILS # BLD: 0 K/UL (ref 0–0.2)
BASOPHILS NFR BLD: 1 %
BILIRUB SERPL-MCNC: 0.7 MG/DL (ref 0–1)
BUN SERPL-MCNC: 14 MG/DL (ref 7–20)
CALCIUM SERPL-MCNC: 9.5 MG/DL (ref 8.3–10.6)
CHLORIDE SERPL-SCNC: 97 MMOL/L (ref 99–110)
CHOLEST SERPL-MCNC: 267 MG/DL (ref 0–199)
CO2 SERPL-SCNC: 32 MMOL/L (ref 21–32)
CREAT SERPL-MCNC: <0.5 MG/DL (ref 0.6–1.2)
DEPRECATED RDW RBC AUTO: 13.6 % (ref 12.4–15.4)
EOSINOPHIL # BLD: 0.2 K/UL (ref 0–0.6)
EOSINOPHIL NFR BLD: 4.4 %
GFR SERPLBLD CREATININE-BSD FMLA CKD-EPI: >60 ML/MIN/{1.73_M2}
GLUCOSE SERPL-MCNC: 83 MG/DL (ref 70–99)
HCT VFR BLD AUTO: 42.4 % (ref 36–48)
HDLC SERPL-MCNC: 87 MG/DL (ref 40–60)
HGB BLD-MCNC: 14.3 G/DL (ref 12–16)
LDLC SERPL CALC-MCNC: 164 MG/DL
LYMPHOCYTES # BLD: 1.3 K/UL (ref 1–5.1)
LYMPHOCYTES NFR BLD: 28.5 %
MCH RBC QN AUTO: 33.2 PG (ref 26–34)
MCHC RBC AUTO-ENTMCNC: 33.7 G/DL (ref 31–36)
MCV RBC AUTO: 98.8 FL (ref 80–100)
MONOCYTES # BLD: 0.4 K/UL (ref 0–1.3)
MONOCYTES NFR BLD: 7.9 %
NEUTROPHILS # BLD: 2.6 K/UL (ref 1.7–7.7)
NEUTROPHILS NFR BLD: 58.2 %
PLATELET # BLD AUTO: 242 K/UL (ref 135–450)
PMV BLD AUTO: 8.5 FL (ref 5–10.5)
POTASSIUM SERPL-SCNC: 4.6 MMOL/L (ref 3.5–5.1)
PROT SERPL-MCNC: 7.4 G/DL (ref 6.4–8.2)
RBC # BLD AUTO: 4.29 M/UL (ref 4–5.2)
SODIUM SERPL-SCNC: 137 MMOL/L (ref 136–145)
TRIGL SERPL-MCNC: 79 MG/DL (ref 0–150)
TSH SERPL DL<=0.005 MIU/L-ACNC: 1 UIU/ML (ref 0.27–4.2)
VLDLC SERPL CALC-MCNC: 16 MG/DL
WBC # BLD AUTO: 4.4 K/UL (ref 4–11)

## 2024-03-18 PROCEDURE — 99213 OFFICE O/P EST LOW 20 MIN: CPT

## 2024-03-18 NOTE — PROGRESS NOTES
Department of Podiatry  Resident Progress Note    Marium Marcus  Allergies: Ace inhibitors    SUBJECTIVE  The patient is a 58 y.o. female who presents with to clinic today for follow up of fracture of the right hallux distal phalanx. The patient states that she has not had any pain in her right foot since her last visit. She states that she has been walking in normal shoe gear. She does relate some mild swelling to the great toe and 3rd toe of the right foot. She states that she does still have the boot and used it one day when she knew she was going to on her feet most of the day. Patient denies any N/V/F/SOB/CP. Patient denies any other pedal complaints today.    Past Medical History:        Diagnosis Date    Arthritis     rt big toe     Depression     chronic    Environmental allergies     FH: celiac disease     negative antibody testing 1/2015.    History of chickenpox     History of mumps as a child     Personal history of COVID-19 09/2021       REVIEW OF SYSTEMS:  A 12 point review of systems is unremarkable with the exception of the chief complaint. Patient specifically denies nausea, vomiting, fever, chills, shortness of breath, chest pain, abdominal pain, constipation, or difficulty urinating.     OBJECTIVE  Patient presents to clinic today unaccompanied and unassisted wearing slip on shoes to both feet. Patient is Aox3 and appears in NAD.    VASCULAR: DP and PT pulses are palpable 2/4 b/l. CFT is brisk to the digits of the foot b/l. Skin temperature is warm to cool from proximal to distal with no focal calor noted. Mild non-pitting edema to the hallux and third toe of the right foot and ankle. Erythema noted to the hallux with no increase in temperature noted. No pain with calf compression b/l.    NEUROLOGIC: Gross and epicritic sensation is intact b/l. Protective sensation is appreciated at all pedal sites b/l.    DERMATOLOGIC: Nails 1-5 b/l are within normal limits of length, thickness, and color.

## 2024-03-22 DIAGNOSIS — E78.00 HYPERCHOLESTEROLEMIA: Primary | ICD-10-CM

## 2024-05-03 ENCOUNTER — OFFICE VISIT (OUTPATIENT)
Dept: INTERNAL MEDICINE CLINIC | Age: 59
End: 2024-05-03
Payer: COMMERCIAL

## 2024-05-03 VITALS
SYSTOLIC BLOOD PRESSURE: 136 MMHG | HEART RATE: 76 BPM | HEIGHT: 66 IN | DIASTOLIC BLOOD PRESSURE: 86 MMHG | BODY MASS INDEX: 26.2 KG/M2 | WEIGHT: 163 LBS | TEMPERATURE: 97.9 F | OXYGEN SATURATION: 99 %

## 2024-05-03 DIAGNOSIS — Z01.818 PRE-OP EXAMINATION: Primary | ICD-10-CM

## 2024-05-03 DIAGNOSIS — H25.9 AGE-RELATED CATARACT OF BOTH EYES, UNSPECIFIED AGE-RELATED CATARACT TYPE: ICD-10-CM

## 2024-05-03 PROCEDURE — 3079F DIAST BP 80-89 MM HG: CPT | Performed by: STUDENT IN AN ORGANIZED HEALTH CARE EDUCATION/TRAINING PROGRAM

## 2024-05-03 PROCEDURE — 93000 ELECTROCARDIOGRAM COMPLETE: CPT | Performed by: STUDENT IN AN ORGANIZED HEALTH CARE EDUCATION/TRAINING PROGRAM

## 2024-05-03 PROCEDURE — 99213 OFFICE O/P EST LOW 20 MIN: CPT | Performed by: STUDENT IN AN ORGANIZED HEALTH CARE EDUCATION/TRAINING PROGRAM

## 2024-05-03 PROCEDURE — 3075F SYST BP GE 130 - 139MM HG: CPT | Performed by: STUDENT IN AN ORGANIZED HEALTH CARE EDUCATION/TRAINING PROGRAM

## 2024-05-03 NOTE — PROGRESS NOTES
None  Measurement of Exercise Tolerance before Surgery >4 Yes    According to the 2014 ACC/AHA pre-operative risk assessment guidelines Marium Marcus is a low risk for major cardiac complications during a low risk procedure and may continue as planned. Specific medication recommendations are listed below. Medications recommended to continue should be taken with a sip of water even when NPO.     Further recommendations from consultants: None    Medication Recommendations:  Hold multivitamin and supplement for 1 week prior to surgery.     1. Preoperative workup as follows: H&P, ECG, recent labs reviewed no indications for repeat labs  2. Change in medicationregimen before surgery: None   3. Prophylaxis for cardiac events with perioperative beta-blockers:  Not indicated   4. Deep vein thrombosis prophylaxis:  As per surgeon, early ambulation    Jocelyn Rg MD  5/3/2024, 11:38 AM

## 2024-05-09 RX ORDER — CITALOPRAM 20 MG/1
20 TABLET ORAL DAILY
Qty: 90 TABLET | Refills: 1 | Status: SHIPPED | OUTPATIENT
Start: 2024-05-09

## 2024-06-02 PROBLEM — Z01.818 PRE-OP EXAMINATION: Status: RESOLVED | Noted: 2024-05-03 | Resolved: 2024-06-02

## 2024-08-08 RX ORDER — CITALOPRAM 20 MG/1
20 TABLET ORAL DAILY
Qty: 90 TABLET | Refills: 1 | Status: SHIPPED | OUTPATIENT
Start: 2024-08-08

## 2024-12-09 RX ORDER — LOSARTAN POTASSIUM 50 MG/1
50 TABLET ORAL DAILY
Qty: 90 TABLET | Refills: 1 | Status: SHIPPED | OUTPATIENT
Start: 2024-12-09

## (undated) DEVICE — 60 ML SYRINGE REGULAR TIP: Brand: MONOJECT

## (undated) DEVICE — ELECTRODE,ECG,STRESS,FOAM,3PK: Brand: MEDLINE

## (undated) DEVICE — Device: Brand: DEFENDO VALVE AND CONNECTOR KIT

## (undated) DEVICE — ENDO CARRY-ON PROCEDURE KIT INCLUDES SUCTION TUBING, LUBRICANT, GAUZE, BIOHAZARD STICKER, TRANSPORT PAD AND INTERCEPT BEDSIDE KIT.: Brand: ENDO CARRY-ON PROCEDURE KIT

## (undated) DEVICE — SET GRAV VENT NVENT CK VLV 3 NDL FREE PRT 10 GTT

## (undated) DEVICE — Device

## (undated) DEVICE — 3M™ TEGADERM™ TRANSPARENT FILM DRESSING FRAME STYLE, 1624W, 2-3/8 IN X 2-3/4 IN (6 CM X 7 CM), 100/CT 4CT/CASE: Brand: 3M™ TEGADERM™

## (undated) DEVICE — SOLUTION IV 1000ML LAC RINGERS PH 6.5 INJ USP VIAFLX PLAS

## (undated) DEVICE — Device: Brand: JELCO

## (undated) DEVICE — CANNULA NSL AD L7FT DIV O2 CO2 W/ M LUERLOCK TRMPT CONN